# Patient Record
Sex: FEMALE | Race: BLACK OR AFRICAN AMERICAN | NOT HISPANIC OR LATINO | Employment: FULL TIME | RURAL
[De-identification: names, ages, dates, MRNs, and addresses within clinical notes are randomized per-mention and may not be internally consistent; named-entity substitution may affect disease eponyms.]

---

## 2022-05-12 ENCOUNTER — HOSPITAL ENCOUNTER (EMERGENCY)
Facility: HOSPITAL | Age: 52
Discharge: HOME | End: 2022-05-12

## 2022-05-12 VITALS
RESPIRATION RATE: 20 BRPM | TEMPERATURE: 98 F | DIASTOLIC BLOOD PRESSURE: 91 MMHG | WEIGHT: 165 LBS | BODY MASS INDEX: 23.62 KG/M2 | SYSTOLIC BLOOD PRESSURE: 153 MMHG | HEART RATE: 74 BPM | HEIGHT: 70 IN | OXYGEN SATURATION: 100 %

## 2022-05-12 DIAGNOSIS — S20.212A RIB CONTUSION, LEFT, INITIAL ENCOUNTER: ICD-10-CM

## 2022-05-12 DIAGNOSIS — S52.202A FRACTURE OF RADIAL SHAFT WITH ULNA, CLOSED, LEFT, INITIAL ENCOUNTER: ICD-10-CM

## 2022-05-12 DIAGNOSIS — S52.201A CLOSED FRACTURE OF SHAFT OF RIGHT RADIUS AND ULNA, INITIAL ENCOUNTER: ICD-10-CM

## 2022-05-12 DIAGNOSIS — S00.03XA CONTUSION OF SCALP, INITIAL ENCOUNTER: ICD-10-CM

## 2022-05-12 DIAGNOSIS — S52.301A CLOSED FRACTURE OF SHAFT OF RIGHT RADIUS AND ULNA, INITIAL ENCOUNTER: ICD-10-CM
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
## 2022-05-12 DIAGNOSIS — S52.302A FRACTURE OF RADIAL SHAFT WITH ULNA, CLOSED, LEFT, INITIAL ENCOUNTER: ICD-10-CM

## 2022-05-12 DIAGNOSIS — N39.0 ACUTE URINARY TRACT INFECTION: ICD-10-CM

## 2022-05-12 DIAGNOSIS — R07.81 RIB PAIN ON LEFT SIDE: ICD-10-CM

## 2022-05-12 DIAGNOSIS — I10 SEVERE UNCONTROLLED HYPERTENSION: ICD-10-CM

## 2022-05-12 LAB
ALBUMIN SERPL BCP-MCNC: 3.9 G/DL (ref 3.5–5)
ALBUMIN/GLOB SERPL: 1.2 {RATIO}
ALP SERPL-CCNC: 107 U/L (ref 41–108)
ALT SERPL W P-5'-P-CCNC: 24 U/L (ref 13–56)
AMPHET UR QL SCN: NEGATIVE
ANION GAP SERPL CALCULATED.3IONS-SCNC: 20 MMOL/L (ref 7–16)
AST SERPL W P-5'-P-CCNC: 27 U/L (ref 15–37)
B-HCG UR QL: NEGATIVE
BACTERIA #/AREA URNS HPF: ABNORMAL /HPF
BARBITURATES UR QL SCN: NEGATIVE
BASOPHILS # BLD AUTO: 0.03 K/UL (ref 0–0.2)
BASOPHILS NFR BLD AUTO: 0.2 % (ref 0–1)
BENZODIAZ METAB UR QL SCN: NEGATIVE
BILIRUB SERPL-MCNC: 0.8 MG/DL (ref 0–1.2)
BILIRUB UR QL STRIP: NEGATIVE
BUN SERPL-MCNC: 11 MG/DL (ref 7–18)
BUN/CREAT SERPL: 12 (ref 6–20)
CALCIUM SERPL-MCNC: 9.1 MG/DL (ref 8.5–10.1)
CANNABINOIDS UR QL SCN: POSITIVE
CHLORIDE SERPL-SCNC: 104 MMOL/L (ref 98–107)
CLARITY UR: ABNORMAL
CO2 SERPL-SCNC: 20 MMOL/L (ref 21–32)
COCAINE UR QL SCN: POSITIVE
COLOR UR: ABNORMAL
CREAT SERPL-MCNC: 0.93 MG/DL (ref 0.55–1.02)
CTP QC/QA: YES
DIFFERENTIAL METHOD BLD: ABNORMAL
EOSINOPHIL # BLD AUTO: 0.01 K/UL (ref 0–0.5)
EOSINOPHIL NFR BLD AUTO: 0.1 % (ref 1–4)
ERYTHROCYTE [DISTWIDTH] IN BLOOD BY AUTOMATED COUNT: 16.6 % (ref 11.5–14.5)
GLOBULIN SER-MCNC: 3.3 G/DL (ref 2–4)
GLUCOSE SERPL-MCNC: 99 MG/DL (ref 74–106)
GLUCOSE UR STRIP-MCNC: NEGATIVE MG/DL
HCT VFR BLD AUTO: 39.4 % (ref 38–47)
HGB BLD-MCNC: 13.1 G/DL (ref 12–16)
IMM GRANULOCYTES # BLD AUTO: 0.25 K/UL (ref 0–0.04)
IMM GRANULOCYTES NFR BLD: 1.5 % (ref 0–0.4)
KETONES UR STRIP-SCNC: 15 MG/DL
LEUKOCYTE ESTERASE UR QL STRIP: ABNORMAL
LYMPHOCYTES # BLD AUTO: 1.42 K/UL (ref 1–4.8)
LYMPHOCYTES NFR BLD AUTO: 8.5 % (ref 27–41)
MAGNESIUM SERPL-MCNC: 1.8 MG/DL (ref 1.7–2.3)
MCH RBC QN AUTO: 28.9 PG (ref 27–31)
MCHC RBC AUTO-ENTMCNC: 33.2 G/DL (ref 32–36)
MCV RBC AUTO: 87 FL (ref 80–96)
MONOCYTES # BLD AUTO: 0.88 K/UL (ref 0–0.8)
MONOCYTES NFR BLD AUTO: 5.3 % (ref 2–6)
MPC BLD CALC-MCNC: 11.6 FL (ref 9.4–12.4)
MUCOUS THREADS #/AREA URNS HPF: ABNORMAL /HPF
NEUTROPHILS # BLD AUTO: 14.12 K/UL (ref 1.8–7.7)
NEUTROPHILS NFR BLD AUTO: 84.4 % (ref 53–65)
NITRITE UR QL STRIP: NEGATIVE
NRBC # BLD AUTO: 0 X10E3/UL
NRBC, AUTO (.00): 0 %
OPIATES UR QL SCN: POSITIVE
PCP UR QL SCN: NEGATIVE
PH UR STRIP: 6 PH UNITS
PLATELET # BLD AUTO: 249 K/UL (ref 150–400)
POTASSIUM SERPL-SCNC: 3.7 MMOL/L (ref 3.5–5.1)
PROT SERPL-MCNC: 7.2 G/DL (ref 6.4–8.2)
PROT UR QL STRIP: 30
RBC # BLD AUTO: 4.53 M/UL (ref 4.2–5.4)
RBC # UR STRIP: ABNORMAL /UL
RBC #/AREA URNS HPF: ABNORMAL /HPF
SODIUM SERPL-SCNC: 140 MMOL/L (ref 136–145)
SP GR UR STRIP: 1.02
SQUAMOUS #/AREA URNS LPF: ABNORMAL /LPF
UROBILINOGEN UR STRIP-ACNC: 0.2 MG/DL
WBC # BLD AUTO: 16.71 K/UL (ref 4.5–11)
WBC #/AREA URNS HPF: ABNORMAL /HPF

## 2022-05-12 PROCEDURE — 96361 HYDRATE IV INFUSION ADD-ON: CPT

## 2022-05-12 PROCEDURE — 81025 URINE PREGNANCY TEST: CPT | Performed by: NURSE PRACTITIONER

## 2022-05-12 PROCEDURE — 80307 DRUG TEST PRSMV CHEM ANLYZR: CPT | Performed by: NURSE PRACTITIONER

## 2022-05-12 PROCEDURE — 25000003 PHARM REV CODE 250: Performed by: NURSE PRACTITIONER

## 2022-05-12 PROCEDURE — 96375 TX/PRO/DX INJ NEW DRUG ADDON: CPT

## 2022-05-12 PROCEDURE — 99284 PR EMERGENCY DEPT VISIT,LEVEL IV: ICD-10-PCS | Mod: ,,, | Performed by: NURSE PRACTITIONER

## 2022-05-12 PROCEDURE — 81001 URINALYSIS AUTO W/SCOPE: CPT | Performed by: NURSE PRACTITIONER

## 2022-05-12 PROCEDURE — 85025 COMPLETE CBC W/AUTO DIFF WBC: CPT | Performed by: NURSE PRACTITIONER

## 2022-05-12 PROCEDURE — 87086 URINE CULTURE/COLONY COUNT: CPT | Performed by: NURSE PRACTITIONER

## 2022-05-12 PROCEDURE — 83735 ASSAY OF MAGNESIUM: CPT | Performed by: NURSE PRACTITIONER

## 2022-05-12 PROCEDURE — 99285 EMERGENCY DEPT VISIT HI MDM: CPT | Mod: 25

## 2022-05-12 PROCEDURE — 63600175 PHARM REV CODE 636 W HCPCS: Performed by: NURSE PRACTITIONER

## 2022-05-12 PROCEDURE — 96365 THER/PROPH/DIAG IV INF INIT: CPT

## 2022-05-12 PROCEDURE — 87077 CULTURE AEROBIC IDENTIFY: CPT | Performed by: NURSE PRACTITIONER

## 2022-05-12 PROCEDURE — 80053 COMPREHEN METABOLIC PANEL: CPT | Performed by: NURSE PRACTITIONER

## 2022-05-12 PROCEDURE — 99284 EMERGENCY DEPT VISIT MOD MDM: CPT | Mod: ,,, | Performed by: NURSE PRACTITIONER

## 2022-05-12 PROCEDURE — 36415 COLL VENOUS BLD VENIPUNCTURE: CPT | Performed by: NURSE PRACTITIONER

## 2022-05-12 RX ORDER — LABETALOL HYDROCHLORIDE 5 MG/ML
10 INJECTION, SOLUTION INTRAVENOUS
Status: COMPLETED | OUTPATIENT
Start: 2022-05-12 | End: 2022-05-12

## 2022-05-12 RX ORDER — HYDRALAZINE HYDROCHLORIDE 20 MG/ML
10 INJECTION INTRAMUSCULAR; INTRAVENOUS
Status: COMPLETED | OUTPATIENT
Start: 2022-05-12 | End: 2022-05-12

## 2022-05-12 RX ORDER — ONDANSETRON 2 MG/ML
4 INJECTION INTRAMUSCULAR; INTRAVENOUS
Status: COMPLETED | OUTPATIENT
Start: 2022-05-12 | End: 2022-05-12

## 2022-05-12 RX ORDER — NITROFURANTOIN 25; 75 MG/1; MG/1
100 CAPSULE ORAL 2 TIMES DAILY
Qty: 20 CAPSULE | Refills: 0 | Status: SHIPPED | OUTPATIENT
Start: 2022-05-12 | End: 2022-05-22

## 2022-05-12 RX ORDER — IBUPROFEN 800 MG/1
800 TABLET ORAL EVERY 6 HOURS PRN
Qty: 20 TABLET | Refills: 0 | Status: SHIPPED | OUTPATIENT
Start: 2022-05-12

## 2022-05-12 RX ORDER — MORPHINE SULFATE 4 MG/ML
4 INJECTION, SOLUTION INTRAMUSCULAR; INTRAVENOUS
Status: COMPLETED | OUTPATIENT
Start: 2022-05-12 | End: 2022-05-12

## 2022-05-12 RX ORDER — TRAMADOL HYDROCHLORIDE 50 MG/1
50 TABLET ORAL EVERY 6 HOURS PRN
Qty: 12 TABLET | Refills: 0 | Status: SHIPPED | OUTPATIENT
Start: 2022-05-12

## 2022-05-12 RX ORDER — HYDROCODONE BITARTRATE AND ACETAMINOPHEN 10; 325 MG/1; MG/1
1 TABLET ORAL
Status: COMPLETED | OUTPATIENT
Start: 2022-05-12 | End: 2022-05-12

## 2022-05-12 RX ADMIN — ONDANSETRON 4 MG: 2 INJECTION INTRAMUSCULAR; INTRAVENOUS at 02:05

## 2022-05-12 RX ADMIN — CEFTRIAXONE SODIUM 1 G: 1 INJECTION, POWDER, FOR SOLUTION INTRAMUSCULAR; INTRAVENOUS at 02:05

## 2022-05-12 RX ADMIN — HYDRALAZINE HYDROCHLORIDE 10 MG: 20 INJECTION INTRAMUSCULAR; INTRAVENOUS at 12:05

## 2022-05-12 RX ADMIN — HYDROCODONE BITARTRATE AND ACETAMINOPHEN 1 TABLET: 10; 325 TABLET ORAL at 05:05

## 2022-05-12 RX ADMIN — LABETALOL HYDROCHLORIDE 10 MG: 5 INJECTION, SOLUTION INTRAVENOUS at 02:05

## 2022-05-12 RX ADMIN — MORPHINE SULFATE 4 MG: 4 INJECTION INTRAVENOUS at 02:05

## 2022-05-12 RX ADMIN — SODIUM CHLORIDE 1000 ML: 9 INJECTION, SOLUTION INTRAVENOUS at 12:05

## 2022-05-12 NOTE — ED NOTES
sugartong splints applied to left and right arm and arm slings applied. Elevated both arms on pillows. Capillary refill < 3 seconds bilaterally.

## 2022-05-12 NOTE — DISCHARGE INSTRUCTIONS
Rest, ice, and elevate bilateral arms.   Wear casts until follow up with your ortho.  Schedule appointment with your ortho in [place] in next 2-3 days.   Take medication as prescribed.   Encourage fluid intake to keep hydrated.   Return to ER with new or worsening symptoms.
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
## 2022-05-14 LAB — UA COMPLETE W REFLEX CULTURE PNL UR: ABNORMAL

## 2023-10-11 ENCOUNTER — OFFICE VISIT (OUTPATIENT)
Dept: OBSTETRICS AND GYNECOLOGY | Facility: CLINIC | Age: 53
End: 2023-10-11

## 2023-10-11 VITALS
SYSTOLIC BLOOD PRESSURE: 160 MMHG | OXYGEN SATURATION: 98 % | HEIGHT: 70 IN | DIASTOLIC BLOOD PRESSURE: 110 MMHG | RESPIRATION RATE: 18 BRPM | HEART RATE: 86 BPM | WEIGHT: 161 LBS | BODY MASS INDEX: 23.05 KG/M2

## 2023-10-11 DIAGNOSIS — Z01.818 PRE-OP EXAM: ICD-10-CM

## 2023-10-11 DIAGNOSIS — N93.9 VAGINAL BLEEDING: Primary | ICD-10-CM

## 2023-10-11 PROCEDURE — 99204 PR OFFICE/OUTPT VISIT, NEW, LEVL IV, 45-59 MIN: ICD-10-PCS | Mod: S$PBB,25,, | Performed by: OBSTETRICS & GYNECOLOGY

## 2023-10-11 PROCEDURE — 99999PBSHW PR PBB SHADOW TECHNICAL ONLY FILED TO HB: Mod: PBBFAC,,,

## 2023-10-11 PROCEDURE — 99204 OFFICE O/P NEW MOD 45 MIN: CPT | Mod: S$PBB,25,, | Performed by: OBSTETRICS & GYNECOLOGY

## 2023-10-11 PROCEDURE — 96372 THER/PROPH/DIAG INJ SC/IM: CPT | Mod: PBBFAC | Performed by: OBSTETRICS & GYNECOLOGY

## 2023-10-11 PROCEDURE — 99999PBSHW PR PBB SHADOW TECHNICAL ONLY FILED TO HB: ICD-10-PCS | Mod: PBBFAC,,,

## 2023-10-11 PROCEDURE — 99214 OFFICE O/P EST MOD 30 MIN: CPT | Mod: PBBFAC | Performed by: OBSTETRICS & GYNECOLOGY

## 2023-10-11 RX ORDER — ACETAMINOPHEN AND CODEINE PHOSPHATE 300; 30 MG/1; MG/1
1 TABLET ORAL EVERY 4 HOURS PRN
COMMUNITY
Start: 2023-09-30

## 2023-10-11 RX ORDER — CEFTRIAXONE 500 MG/1
500 INJECTION, POWDER, FOR SOLUTION INTRAMUSCULAR; INTRAVENOUS
Status: COMPLETED | OUTPATIENT
Start: 2023-10-11 | End: 2023-10-11

## 2023-10-11 RX ORDER — DOXYCYCLINE 100 MG/1
100 CAPSULE ORAL 2 TIMES DAILY
Qty: 20 CAPSULE | Refills: 0 | Status: CANCELLED | OUTPATIENT
Start: 2023-10-11

## 2023-10-11 RX ORDER — DOXYCYCLINE 100 MG/1
100 CAPSULE ORAL 2 TIMES DAILY
Qty: 20 CAPSULE | Refills: 0 | Status: SHIPPED | OUTPATIENT
Start: 2023-10-11

## 2023-10-11 RX ADMIN — CEFTRIAXONE SODIUM 500 MG: 500 INJECTION, POWDER, FOR SOLUTION INTRAMUSCULAR; INTRAVENOUS at 04:10

## 2023-10-12 DIAGNOSIS — N93.9 VAGINAL BLEEDING: Primary | ICD-10-CM

## 2023-10-12 NOTE — PROGRESS NOTES
Linda Lopez female  for   Chief Complaint   Patient presents with    Dysfunctional Uterine Bleeding     PT states that she has not had a period over a year now. 3 months ago she has been having discharge the color red/grayish. Also states that she is bleeding through everything. She has passed multiple of clots.  that referred her stated that she had fibroids. She is having pelvic pain and pressure .      OB History    No obstetric history on file.          Past Medical History:   Diagnosis Date    Hypertension       Past Surgical History:   Procedure Laterality Date    BILATERAL TUBAL LIGATION Bilateral     CHOLECYSTECTOMY        Review of patient's allergies indicates:  No Known Allergies          Physical exam:     General Appearance: healthy    Abdomen:Normal, benign.    Pelvic: Pelvic exam: normal external genitalia, vulva, vagina, cervix, uterus and adnexa, VULVA: normal appearing vulva with no masses, tenderness or lesions, CERVIX: normal appearing cervix without discharge or lesions, UTERUS:  Enlarged and irregular, somewhat tender, ADNEXA: normal adnexa in size, nontender and no masses, RECTAL:deferred normal rectal, no masses, guaiac negative stool obtained.     Extremity:normal    Skin: normal exam        Assessment:   Problem List Items Addressed This Visit    None  Visit Diagnoses       Pre-op exam    -  Primary    Vaginal bleeding        Relevant Medications    doxycycline (VIBRAMYCIN) 100 MG Cap    Other Relevant Orders    CBC Auto Differential (Completed)    Comprehensive Metabolic Panel (Completed)    Type & Screen (Completed)    Syphilis Antibody with reflex to RPR (Completed)    Urine culture    Urinalysis, Reflex to Urine Culture (Completed)    HCG, Serum, Qualitative (Completed)    US Pelvis Complete Non OB             Plan:  The patient is bleeding has been somewhat heavy and she did go 4 year without having a period.  The patient has been scheduled for a D&C, hysteroscopy for evaluation for  postmenopausal bleeding.  Hemoglobin and hematocrit were obtained today and she was given 500 mg of Rocephin IM.  A sonogram also has been scheduled.

## 2023-10-16 ENCOUNTER — HOSPITAL ENCOUNTER (OUTPATIENT)
Dept: RADIOLOGY | Facility: HOSPITAL | Age: 53
Discharge: HOME | End: 2023-10-16
Attending: OBSTETRICS & GYNECOLOGY

## 2023-10-16 ENCOUNTER — OFFICE VISIT (OUTPATIENT)
Dept: OBSTETRICS AND GYNECOLOGY | Facility: CLINIC | Age: 53
End: 2023-10-16

## 2023-10-16 VITALS
BODY MASS INDEX: 23.05 KG/M2 | SYSTOLIC BLOOD PRESSURE: 160 MMHG | HEIGHT: 70 IN | WEIGHT: 161 LBS | DIASTOLIC BLOOD PRESSURE: 100 MMHG

## 2023-10-16 DIAGNOSIS — A49.9 BACTERIAL INFECTION: ICD-10-CM

## 2023-10-16 DIAGNOSIS — N93.9 VAGINAL BLEEDING: Primary | ICD-10-CM

## 2023-10-16 DIAGNOSIS — N93.9 VAGINAL BLEEDING: ICD-10-CM

## 2023-10-16 PROCEDURE — 99999PBSHW PR PBB SHADOW TECHNICAL ONLY FILED TO HB: Mod: PBBFAC,,,

## 2023-10-16 PROCEDURE — 76830 TRANSVAGINAL US NON-OB: CPT | Mod: 26,,, | Performed by: RADIOLOGY

## 2023-10-16 PROCEDURE — 99213 OFFICE O/P EST LOW 20 MIN: CPT | Mod: PBBFAC,25 | Performed by: OBSTETRICS & GYNECOLOGY

## 2023-10-16 PROCEDURE — 99999PBSHW PR PBB SHADOW TECHNICAL ONLY FILED TO HB: ICD-10-PCS | Mod: PBBFAC,,,

## 2023-10-16 PROCEDURE — 96372 THER/PROPH/DIAG INJ SC/IM: CPT | Mod: PBBFAC | Performed by: OBSTETRICS & GYNECOLOGY

## 2023-10-16 PROCEDURE — 76830 US OB <14 WEEKS, TRANSABDOM & TRANSVAG, SINGLE GESTATION (XPD): ICD-10-PCS | Mod: 26,,, | Performed by: RADIOLOGY

## 2023-10-16 PROCEDURE — 99213 PR OFFICE/OUTPT VISIT, EST, LEVL III, 20-29 MIN: ICD-10-PCS | Mod: S$PBB,25,, | Performed by: OBSTETRICS & GYNECOLOGY

## 2023-10-16 PROCEDURE — 76801 OB US < 14 WKS SINGLE FETUS: CPT | Mod: TC

## 2023-10-16 PROCEDURE — 99213 OFFICE O/P EST LOW 20 MIN: CPT | Mod: S$PBB,25,, | Performed by: OBSTETRICS & GYNECOLOGY

## 2023-10-16 PROCEDURE — 76856 US OB <14 WEEKS, TRANSABDOM & TRANSVAG, SINGLE GESTATION (XPD): ICD-10-PCS | Mod: 26,,, | Performed by: RADIOLOGY

## 2023-10-16 PROCEDURE — 76856 US EXAM PELVIC COMPLETE: CPT | Mod: 26,,, | Performed by: RADIOLOGY

## 2023-10-16 RX ORDER — CEFTRIAXONE 500 MG/1
500 INJECTION, POWDER, FOR SOLUTION INTRAMUSCULAR; INTRAVENOUS
Status: COMPLETED | OUTPATIENT
Start: 2023-10-16 | End: 2023-10-16

## 2023-10-16 RX ADMIN — CEFTRIAXONE SODIUM 500 MG: 500 INJECTION, POWDER, FOR SOLUTION INTRAMUSCULAR; INTRAVENOUS at 01:10

## 2023-10-17 NOTE — PROGRESS NOTES
Linda Lopez female  for   Chief Complaint   Patient presents with    Follow-up     PT is here following up US results, she is also still in pain.      OB History    No obstetric history on file.          Past Medical History:   Diagnosis Date    Hypertension       Past Surgical History:   Procedure Laterality Date    BILATERAL TUBAL LIGATION Bilateral     CHOLECYSTECTOMY        Review of patient's allergies indicates:  No Known Allergies          Physical exam:     General Appearance: healthy    Abdomen:Normal, benign.    Pelvic: Pelvic exam: normal external genitalia, vulva, vagina, cervix, uterus and adnexa, VULVA: normal appearing vulva with no masses, tenderness or lesions, CERVIX: normal appearing cervix without discharge or lesions, UTERUS:  Enlarged and tender, ADNEXA: normal adnexa in size, nontender and no masses, RECTAL:deferred normal rectal, no masses, guaiac negative stool obtained.     Extremity:normal    Skin: normal exam        Assessment:   Problem List Items Addressed This Visit    None  Visit Diagnoses       Vaginal bleeding    -  Primary    Bacterial infection        Relevant Orders    Chlamydia/GC, PCR             Plan:  The patient had 2 minimally positive pregnancy test that was a false positive.  The patient has not had sex in a year and a half.  The patient is uterus is  and she was given Rocephin today and continue her or on oral antibiotics.  A CBC, gonorrhea and chlamydia cultures were obtained today.  The patient's sonogram continues to show a thickened endometrium and the calves suffocation inside the uterus possibly representative fibroid.  She is been scheduled for a D&C, hysteroscopy.

## 2023-10-18 ENCOUNTER — HOSPITAL ENCOUNTER (INPATIENT)
Facility: HOSPITAL | Age: 53
LOS: 4 days | Discharge: ANOTHER HOSPITAL | DRG: 744 | End: 2023-10-24
Attending: OBSTETRICS & GYNECOLOGY | Admitting: OBSTETRICS & GYNECOLOGY

## 2023-10-18 ENCOUNTER — ANESTHESIA EVENT (OUTPATIENT)
Dept: SURGERY | Facility: HOSPITAL | Age: 53
DRG: 744 | End: 2023-10-18

## 2023-10-18 ENCOUNTER — ANESTHESIA (OUTPATIENT)
Dept: SURGERY | Facility: HOSPITAL | Age: 53
DRG: 744 | End: 2023-10-18

## 2023-10-18 ENCOUNTER — OFFICE VISIT (OUTPATIENT)
Dept: OBSTETRICS AND GYNECOLOGY | Facility: CLINIC | Age: 53
End: 2023-10-18

## 2023-10-18 VITALS — SYSTOLIC BLOOD PRESSURE: 160 MMHG | DIASTOLIC BLOOD PRESSURE: 100 MMHG | HEIGHT: 70 IN | BODY MASS INDEX: 23.1 KG/M2

## 2023-10-18 DIAGNOSIS — N93.9 VAGINA BLEEDING: ICD-10-CM

## 2023-10-18 DIAGNOSIS — N93.9 VAGINAL BLEEDING: Primary | ICD-10-CM

## 2023-10-18 LAB
CREAT SERPL-MCNC: 1.32 MG/DL (ref 0.55–1.02)
EGFR (NO RACE VARIABLE) (RUSH/TITUS): 49 ML/MIN/1.73M2

## 2023-10-18 PROCEDURE — 88342 SURGICAL PATHOLOGY: ICD-10-PCS | Mod: 26,,, | Performed by: PATHOLOGY

## 2023-10-18 PROCEDURE — 37000009 HC ANESTHESIA EA ADD 15 MINS: Performed by: OBSTETRICS & GYNECOLOGY

## 2023-10-18 PROCEDURE — 25000003 PHARM REV CODE 250: Performed by: OBSTETRICS & GYNECOLOGY

## 2023-10-18 PROCEDURE — 88363 XM ARCHIVE TISSUE MOLEC ANAL: CPT | Mod: ,,, | Performed by: PATHOLOGY

## 2023-10-18 PROCEDURE — 71000033 HC RECOVERY, INTIAL HOUR: Performed by: OBSTETRICS & GYNECOLOGY

## 2023-10-18 PROCEDURE — 88341 SURGICAL PATHOLOGY: ICD-10-PCS | Mod: 26,,, | Performed by: PATHOLOGY

## 2023-10-18 PROCEDURE — 88305 SURGICAL PATHOLOGY: ICD-10-PCS | Mod: 26,59,, | Performed by: PATHOLOGY

## 2023-10-18 PROCEDURE — 27000655: Performed by: NURSE ANESTHETIST, CERTIFIED REGISTERED

## 2023-10-18 PROCEDURE — 37000008 HC ANESTHESIA 1ST 15 MINUTES: Performed by: OBSTETRICS & GYNECOLOGY

## 2023-10-18 PROCEDURE — 88363 SURGICAL PATHOLOGY: ICD-10-PCS | Mod: ,,, | Performed by: PATHOLOGY

## 2023-10-18 PROCEDURE — 27000177 HC AIRWAY, LARYNGEAL MASK: Performed by: NURSE ANESTHETIST, CERTIFIED REGISTERED

## 2023-10-18 PROCEDURE — 88342 IMHCHEM/IMCYTCHM 1ST ANTB: CPT | Mod: 26,,, | Performed by: PATHOLOGY

## 2023-10-18 PROCEDURE — 25000003 PHARM REV CODE 250: Performed by: NURSE ANESTHETIST, CERTIFIED REGISTERED

## 2023-10-18 PROCEDURE — 25000003 PHARM REV CODE 250

## 2023-10-18 PROCEDURE — 63600175 PHARM REV CODE 636 W HCPCS

## 2023-10-18 PROCEDURE — 99213 PR OFFICE/OUTPT VISIT, EST, LEVL III, 20-29 MIN: ICD-10-PCS | Mod: S$PBB,,, | Performed by: OBSTETRICS & GYNECOLOGY

## 2023-10-18 PROCEDURE — 99213 OFFICE O/P EST LOW 20 MIN: CPT | Mod: PBBFAC,25 | Performed by: OBSTETRICS & GYNECOLOGY

## 2023-10-18 PROCEDURE — 88305 TISSUE EXAM BY PATHOLOGIST: CPT | Mod: 26,59,, | Performed by: PATHOLOGY

## 2023-10-18 PROCEDURE — 63600175 PHARM REV CODE 636 W HCPCS: Performed by: ANESTHESIOLOGY

## 2023-10-18 PROCEDURE — D9220A PRA ANESTHESIA: ICD-10-PCS | Mod: ,,, | Performed by: ANESTHESIOLOGY

## 2023-10-18 PROCEDURE — 71000039 HC RECOVERY, EACH ADD'L HOUR: Performed by: OBSTETRICS & GYNECOLOGY

## 2023-10-18 PROCEDURE — 63600175 PHARM REV CODE 636 W HCPCS: Performed by: NURSE ANESTHETIST, CERTIFIED REGISTERED

## 2023-10-18 PROCEDURE — 88305 TISSUE EXAM BY PATHOLOGIST: CPT | Mod: TC,SUR | Performed by: OBSTETRICS & GYNECOLOGY

## 2023-10-18 PROCEDURE — 36000706: Performed by: OBSTETRICS & GYNECOLOGY

## 2023-10-18 PROCEDURE — 27000510 HC BLANKET BAIR HUGGER ANY SIZE: Performed by: NURSE ANESTHETIST, CERTIFIED REGISTERED

## 2023-10-18 PROCEDURE — 88341 IMHCHEM/IMCYTCHM EA ADD ANTB: CPT | Mod: 26,,, | Performed by: PATHOLOGY

## 2023-10-18 PROCEDURE — D9220A PRA ANESTHESIA: Mod: ,,, | Performed by: ANESTHESIOLOGY

## 2023-10-18 PROCEDURE — 36000707: Performed by: OBSTETRICS & GYNECOLOGY

## 2023-10-18 PROCEDURE — 82565 ASSAY OF CREATININE: CPT | Performed by: OBSTETRICS & GYNECOLOGY

## 2023-10-18 PROCEDURE — 99900035 HC TECH TIME PER 15 MIN (STAT)

## 2023-10-18 PROCEDURE — 63600175 PHARM REV CODE 636 W HCPCS: Performed by: OBSTETRICS & GYNECOLOGY

## 2023-10-18 PROCEDURE — 99213 OFFICE O/P EST LOW 20 MIN: CPT | Mod: S$PBB,,, | Performed by: OBSTETRICS & GYNECOLOGY

## 2023-10-18 RX ORDER — DEXTROSE MONOHYDRATE 5 G/100ML
INJECTION INTRAVENOUS
Status: COMPLETED
Start: 2023-10-18 | End: 2023-10-18

## 2023-10-18 RX ORDER — DEXAMETHASONE SODIUM PHOSPHATE 4 MG/ML
INJECTION, SOLUTION INTRA-ARTICULAR; INTRALESIONAL; INTRAMUSCULAR; INTRAVENOUS; SOFT TISSUE
Status: DISCONTINUED | OUTPATIENT
Start: 2023-10-18 | End: 2023-10-18

## 2023-10-18 RX ORDER — DIPHENHYDRAMINE HCL 25 MG
25 CAPSULE ORAL EVERY 4 HOURS PRN
Status: CANCELLED | OUTPATIENT
Start: 2023-10-18

## 2023-10-18 RX ORDER — MORPHINE SULFATE 10 MG/ML
4 INJECTION INTRAMUSCULAR; INTRAVENOUS; SUBCUTANEOUS EVERY 5 MIN PRN
Status: DISCONTINUED | OUTPATIENT
Start: 2023-10-18 | End: 2023-10-18 | Stop reason: HOSPADM

## 2023-10-18 RX ORDER — DIPHENHYDRAMINE HYDROCHLORIDE 50 MG/ML
25 INJECTION INTRAMUSCULAR; INTRAVENOUS EVERY 4 HOURS PRN
Status: DISCONTINUED | OUTPATIENT
Start: 2023-10-18 | End: 2023-10-25 | Stop reason: HOSPADM

## 2023-10-18 RX ORDER — PROCHLORPERAZINE EDISYLATE 5 MG/ML
5 INJECTION INTRAMUSCULAR; INTRAVENOUS EVERY 6 HOURS PRN
Status: DISCONTINUED | OUTPATIENT
Start: 2023-10-18 | End: 2023-10-25 | Stop reason: HOSPADM

## 2023-10-18 RX ORDER — MEPERIDINE HYDROCHLORIDE 25 MG/ML
25 INJECTION INTRAMUSCULAR; INTRAVENOUS; SUBCUTANEOUS ONCE AS NEEDED
Status: DISCONTINUED | OUTPATIENT
Start: 2023-10-18 | End: 2023-10-18 | Stop reason: HOSPADM

## 2023-10-18 RX ORDER — PROCHLORPERAZINE EDISYLATE 5 MG/ML
5 INJECTION INTRAMUSCULAR; INTRAVENOUS EVERY 6 HOURS PRN
Status: CANCELLED | OUTPATIENT
Start: 2023-10-18

## 2023-10-18 RX ORDER — MIDAZOLAM HYDROCHLORIDE 1 MG/ML
INJECTION INTRAMUSCULAR; INTRAVENOUS
Status: DISCONTINUED | OUTPATIENT
Start: 2023-10-18 | End: 2023-10-18

## 2023-10-18 RX ORDER — ONDANSETRON 2 MG/ML
4 INJECTION INTRAMUSCULAR; INTRAVENOUS DAILY PRN
Status: DISCONTINUED | OUTPATIENT
Start: 2023-10-18 | End: 2023-10-18 | Stop reason: HOSPADM

## 2023-10-18 RX ORDER — FENTANYL CITRATE 50 UG/ML
INJECTION, SOLUTION INTRAMUSCULAR; INTRAVENOUS
Status: DISCONTINUED | OUTPATIENT
Start: 2023-10-18 | End: 2023-10-18

## 2023-10-18 RX ORDER — HYDROMORPHONE HYDROCHLORIDE 2 MG/ML
1 INJECTION, SOLUTION INTRAMUSCULAR; INTRAVENOUS; SUBCUTANEOUS EVERY 4 HOURS PRN
Status: CANCELLED | OUTPATIENT
Start: 2023-10-18

## 2023-10-18 RX ORDER — HYDROCODONE BITARTRATE AND ACETAMINOPHEN 5; 325 MG/1; MG/1
1 TABLET ORAL EVERY 4 HOURS PRN
Status: CANCELLED | OUTPATIENT
Start: 2023-10-18

## 2023-10-18 RX ORDER — ONDANSETRON 2 MG/ML
INJECTION INTRAMUSCULAR; INTRAVENOUS
Status: DISCONTINUED | OUTPATIENT
Start: 2023-10-18 | End: 2023-10-18

## 2023-10-18 RX ORDER — ACETAMINOPHEN 325 MG/1
650 TABLET ORAL EVERY 4 HOURS PRN
Status: CANCELLED | OUTPATIENT
Start: 2023-10-18

## 2023-10-18 RX ORDER — HYDROCODONE BITARTRATE AND ACETAMINOPHEN 5; 325 MG/1; MG/1
1 TABLET ORAL EVERY 4 HOURS PRN
Status: DISCONTINUED | OUTPATIENT
Start: 2023-10-18 | End: 2023-10-25 | Stop reason: HOSPADM

## 2023-10-18 RX ORDER — LIDOCAINE HYDROCHLORIDE 20 MG/ML
INJECTION, SOLUTION EPIDURAL; INFILTRATION; INTRACAUDAL; PERINEURAL
Status: DISCONTINUED | OUTPATIENT
Start: 2023-10-18 | End: 2023-10-18

## 2023-10-18 RX ORDER — CEFAZOLIN SODIUM 1 G/3ML
INJECTION, POWDER, FOR SOLUTION INTRAMUSCULAR; INTRAVENOUS
Status: DISCONTINUED | OUTPATIENT
Start: 2023-10-18 | End: 2023-10-18

## 2023-10-18 RX ORDER — HYDRALAZINE HYDROCHLORIDE 20 MG/ML
10 INJECTION INTRAMUSCULAR; INTRAVENOUS ONCE
Status: COMPLETED | OUTPATIENT
Start: 2023-10-18 | End: 2023-10-18

## 2023-10-18 RX ORDER — ONDANSETRON 4 MG/1
8 TABLET, ORALLY DISINTEGRATING ORAL EVERY 8 HOURS PRN
Status: CANCELLED | OUTPATIENT
Start: 2023-10-18

## 2023-10-18 RX ORDER — HYDROMORPHONE HYDROCHLORIDE 2 MG/ML
0.5 INJECTION, SOLUTION INTRAMUSCULAR; INTRAVENOUS; SUBCUTANEOUS EVERY 5 MIN PRN
Status: DISCONTINUED | OUTPATIENT
Start: 2023-10-18 | End: 2023-10-18 | Stop reason: HOSPADM

## 2023-10-18 RX ORDER — PROPOFOL 10 MG/ML
VIAL (ML) INTRAVENOUS
Status: DISCONTINUED | OUTPATIENT
Start: 2023-10-18 | End: 2023-10-18

## 2023-10-18 RX ORDER — ACETAMINOPHEN 325 MG/1
650 TABLET ORAL EVERY 4 HOURS PRN
Status: DISCONTINUED | OUTPATIENT
Start: 2023-10-18 | End: 2023-10-25 | Stop reason: HOSPADM

## 2023-10-18 RX ORDER — DIPHENHYDRAMINE HCL 25 MG
25 CAPSULE ORAL EVERY 4 HOURS PRN
Status: DISCONTINUED | OUTPATIENT
Start: 2023-10-18 | End: 2023-10-25 | Stop reason: HOSPADM

## 2023-10-18 RX ORDER — DIPHENHYDRAMINE HYDROCHLORIDE 50 MG/ML
25 INJECTION INTRAMUSCULAR; INTRAVENOUS EVERY 4 HOURS PRN
Status: CANCELLED | OUTPATIENT
Start: 2023-10-18

## 2023-10-18 RX ORDER — DIPHENHYDRAMINE HYDROCHLORIDE 50 MG/ML
25 INJECTION INTRAMUSCULAR; INTRAVENOUS EVERY 6 HOURS PRN
Status: DISCONTINUED | OUTPATIENT
Start: 2023-10-18 | End: 2023-10-18 | Stop reason: HOSPADM

## 2023-10-18 RX ORDER — ONDANSETRON 4 MG/1
8 TABLET, ORALLY DISINTEGRATING ORAL EVERY 8 HOURS PRN
Status: DISCONTINUED | OUTPATIENT
Start: 2023-10-18 | End: 2023-10-25 | Stop reason: HOSPADM

## 2023-10-18 RX ORDER — HYDROMORPHONE HYDROCHLORIDE 2 MG/ML
1 INJECTION, SOLUTION INTRAMUSCULAR; INTRAVENOUS; SUBCUTANEOUS EVERY 4 HOURS PRN
Status: DISCONTINUED | OUTPATIENT
Start: 2023-10-18 | End: 2023-10-25 | Stop reason: HOSPADM

## 2023-10-18 RX ORDER — KETOROLAC TROMETHAMINE 30 MG/ML
INJECTION, SOLUTION INTRAMUSCULAR; INTRAVENOUS
Status: DISCONTINUED | OUTPATIENT
Start: 2023-10-18 | End: 2023-10-18

## 2023-10-18 RX ORDER — GENTAMICIN SULFATE 40 MG/ML
2.5 INJECTION, SOLUTION INTRAMUSCULAR; INTRAVENOUS
Status: CANCELLED | OUTPATIENT
Start: 2023-10-18

## 2023-10-18 RX ORDER — CLINDAMYCIN PHOSPHATE 900 MG/50ML
900 INJECTION, SOLUTION INTRAVENOUS
Status: DISCONTINUED | OUTPATIENT
Start: 2023-10-18 | End: 2023-10-20

## 2023-10-18 RX ORDER — HYDRALAZINE HYDROCHLORIDE 25 MG/1
25 TABLET, FILM COATED ORAL 2 TIMES DAILY
COMMUNITY
Start: 2023-09-30

## 2023-10-18 RX ADMIN — DEXTROSE MONOHYDRATE: 5 INJECTION INTRAVENOUS at 06:10

## 2023-10-18 RX ADMIN — HYDROCODONE BITARTRATE AND ACETAMINOPHEN 1 TABLET: 5; 325 TABLET ORAL at 04:10

## 2023-10-18 RX ADMIN — KETOROLAC TROMETHAMINE 30 MG: 30 INJECTION, SOLUTION INTRAMUSCULAR at 11:10

## 2023-10-18 RX ADMIN — HYDROMORPHONE HYDROCHLORIDE 0.5 MG: 2 INJECTION INTRAMUSCULAR; INTRAVENOUS; SUBCUTANEOUS at 12:10

## 2023-10-18 RX ADMIN — GENTAMICIN SULFATE 240 MG: 40 INJECTION, SOLUTION INTRAMUSCULAR; INTRAVENOUS at 07:10

## 2023-10-18 RX ADMIN — CLINDAMYCIN PHOSPHATE 900 MG: 900 INJECTION, SOLUTION INTRAVENOUS at 09:10

## 2023-10-18 RX ADMIN — CEFAZOLIN 2 G: 1 INJECTION, POWDER, FOR SOLUTION INTRAMUSCULAR; INTRAVENOUS; PARENTERAL at 11:10

## 2023-10-18 RX ADMIN — CLINDAMYCIN PHOSPHATE 900 MG: 900 INJECTION, SOLUTION INTRAVENOUS at 04:10

## 2023-10-18 RX ADMIN — MIDAZOLAM 2 MG: 1 INJECTION INTRAMUSCULAR; INTRAVENOUS at 11:10

## 2023-10-18 RX ADMIN — PROPOFOL 200 MG: 10 INJECTION, EMULSION INTRAVENOUS at 11:10

## 2023-10-18 RX ADMIN — LIDOCAINE HYDROCHLORIDE 50 MG: 20 INJECTION, SOLUTION INTRAVENOUS at 11:10

## 2023-10-18 RX ADMIN — HYDROCODONE BITARTRATE AND ACETAMINOPHEN 1 TABLET: 5; 325 TABLET ORAL at 08:10

## 2023-10-18 RX ADMIN — CEFAZOLIN 1 G: 1 INJECTION, POWDER, FOR SOLUTION INTRAMUSCULAR; INTRAVENOUS at 07:10

## 2023-10-18 RX ADMIN — ONDANSETRON 4 MG: 2 INJECTION INTRAMUSCULAR; INTRAVENOUS at 11:10

## 2023-10-18 RX ADMIN — FENTANYL CITRATE 100 MCG: 50 INJECTION INTRAMUSCULAR; INTRAVENOUS at 11:10

## 2023-10-18 RX ADMIN — HYDRALAZINE HYDROCHLORIDE 10 MG: 20 INJECTION INTRAMUSCULAR; INTRAVENOUS at 12:10

## 2023-10-18 RX ADMIN — DEXAMETHASONE SODIUM PHOSPHATE 8 MG: 4 INJECTION, SOLUTION INTRA-ARTICULAR; INTRALESIONAL; INTRAMUSCULAR; INTRAVENOUS; SOFT TISSUE at 11:10

## 2023-10-18 RX ADMIN — PROCHLORPERAZINE EDISYLATE 5 MG: 5 INJECTION INTRAMUSCULAR; INTRAVENOUS at 04:10

## 2023-10-18 RX ADMIN — SODIUM CHLORIDE: 9 INJECTION, SOLUTION INTRAVENOUS at 11:10

## 2023-10-18 NOTE — PROGRESS NOTES
1153 RECEIVED TO RR WITH ORAL AIRWAY IN PLACE. O2 VIA FM. HOB ELEVATED. ABDOMEN SOFT, JOSE PAD IN PLACE, NO VAGINAL BLEEDING NOTED. IV INFUSING RIGHT WRIST 20G. CATH. SCD HOSE IN PROGRESS. OBSERVING CLOSELY. SEE FLOW SHEET.      1200 ORAL AIRWAY REMOVED, ORIENTATION GIVEN. NO C/O PAIN. SEE FLOW SHEET.    1220 DIASTOLIC PRESSURE ELEVATED. C/O ABDOMINAL PAIN. DILAUDID TITRATED FOR RELIEF. DR. MEZA AT BEDSIDE AWARE OF B/P READINGS.      1240 EMOTIONAL, CRYING. STATES PAIN BETTER. B/P ELEVATED DR. MEZA MADE AWARE. APRESOLINE ORDERED AND GIVEN.    1250 SMALL AREA OF RED DRAINAGE ON JOSE PAD. CLEAN PAD APPLIED.    1300 CALLED DR. PURDY SEVERAL TIMES FOR POST-OP ORDERS, PHONE GOING TO VOICE MAIL. NO ANSWER AT OFFICE.      1320 SPOKE WITH DR. PURDY VIA PHONE, TO DO ORDERS IN 10 MINUTES, OUT OF THE HOSPITAL.    1400 ORDERS RECEIVED FROM DR. PURDY. SMALL AMOUNT OF RED DRAINAGE ON JOSE PAD. CLEAN PAD APPLIED. TRANSFERRED TO ROOM WITHOUT DISTRESS NOTED.

## 2023-10-18 NOTE — ANESTHESIA PROCEDURE NOTES
Intubation    Date/Time: 10/18/2023 11:12 AM    Performed by: Radha Mg CRNA  Authorized by: Radha Mg CRNA    Intubation:     Induction:  Intravenous    Intubated:  Postinduction    Mask Ventilation:  Easy mask    Attempts:  1    Attempted By:  CRNA    Difficult Airway Encountered?: No      Complications:  None    Airway Device:  Supraglottic airway/LMA    Airway Device Size:  4.0    Style/Cuff Inflation:  Cuffed (inflated to minimal occlusive pressure)    Placement Verified By:  Capnometry    Complicating Factors:  None    Findings Post-Intubation:  Atraumatic/condition of teeth unchanged and BS equal bilateral

## 2023-10-18 NOTE — TRANSFER OF CARE
Anesthesia Transfer of Care Note    Patient: Linda Lopez    Procedure(s) Performed: Procedure(s) (LRB):  HYSTEROSCOPY, WITH DILATION AND CURETTAGE OF UTERUS (N/A)    Patient location: PACU    Anesthesia Type: general    Transport from OR: Transported from OR on 6-10 L/min O2 by face mask with adequate spontaneous ventilation    Post pain: adequate analgesia    Post assessment: no apparent anesthetic complications    Post vital signs: stable    Level of consciousness: responds to stimulation and awake    Nausea/Vomiting: no nausea/vomiting    Complications: none    Transfer of care protocol was followedComments: Good SV continue, NAD noted, VSS, RTRN      Last vitals:   Visit Vitals  /89   Pulse 60   Temp 36.6 °C (97.9 °F)   Resp 18   LMP  (LMP Unknown)   SpO2 100%

## 2023-10-18 NOTE — ANESTHESIA POSTPROCEDURE EVALUATION
Anesthesia Post Evaluation    Patient: Linda Lopez    Procedure(s) Performed: Procedure(s) (LRB):  HYSTEROSCOPY, WITH DILATION AND CURETTAGE OF UTERUS (N/A)  DILATION AND CURETTAGE, UTERUS, USING SUCTION    Final Anesthesia Type: general      Patient location during evaluation: PACU  Patient participation: Yes- Able to Participate  Level of consciousness: awake and sedated  Post-procedure vital signs: reviewed and stable  Pain management: adequate  Airway patency: patent    PONV status at discharge: No PONV  Anesthetic complications: no      Cardiovascular status: blood pressure returned to baseline  Respiratory status: unassisted  Hydration status: euvolemic  Follow-up not needed.          Vitals Value Taken Time   /91 10/18/23 1400   Temp 36.6 °C (97.9 °F) 10/18/23 1156   Pulse 74 10/18/23 1400   Resp 11 10/18/23 1400   SpO2 100 % 10/18/23 1400   Vitals shown include unvalidated device data.      Event Time   Out of Recovery 14:00:00         Pain/Fabricio Score: Pain Rating Prior to Med Admin: 5 (10/18/2023 12:30 PM)  Fabricio Score: 10 (10/18/2023  1:30 PM)

## 2023-10-18 NOTE — OP NOTE
Date10/18/23    Operative report:    Pre op Diagnosis:  Abnormal vaginal bleeding    Post op Diagnosis:  Same    Procedure:  D&C, hysteroscopy    Surgeon: Dr Thomas Dominguez    Anesthesia: General    Findings:  Large amount of necrotic appearing material    Complication(s): none    Condition:good    Estimated Blood loss: less than 50 mL              .

## 2023-10-18 NOTE — PROGRESS NOTES
Pharmacokinetic Initial Assessment: Gentamicin    Assessment:  Weight utilized for dose calculation: Ideal Body Weight  Dosing method utilized: extended interval dosing    Plan: Extended interval dosing regimen: Gentamicin 240 mg IV q36h with a gent trough ordered for 10/20 at 1730    Pharmacy will continue to monitor.    Please contact pharmacy at extension [phone] with any questions regarding this assessment.    Patient brief summary:  Linda Lopez is a 53 y.o. female initiated on aminoglycoside therapy for treatment of suspected urinary tract infection    Drug Allergies:   Review of patient's allergies indicates:  No Known Allergies    Actual Body Weight:   72.6 kg    Adjust Body Weight:   70.1 kg    Ideal Body Weight:  68.5 kg    Renal Function:   Estimated Creatinine Clearance: 54.5 mL/min (A) (based on SCr of 1.32 mg/dL (H)).,     Dialysis Method (if applicable):  N/A    CBC (last 72 hours):  Recent Labs   Lab Result Units 10/16/23  1007   WBC K/uL 17.79*   Hemoglobin g/dL 13.7   Hematocrit % 41.0   Platelet Count K/uL 443*   Lymphocytes % % 14.0*   Monocytes % % 7.7*   Eosinophils % % 0.4*   Basophils % % 0.4   Diff Type  Auto       Metabolic Panel (last 72 hours):  Recent Labs   Lab Result Units 10/18/23  1522   Creatinine mg/dL 1.32*       Microbiologic Results:  Microbiology Results (last 7 days)       ** No results found for the last 168 hours. **

## 2023-10-18 NOTE — ANESTHESIA PREPROCEDURE EVALUATION
10/18/2023  Linda Lopez is a 53 y.o., female.      Pre-op Assessment    I have reviewed the Patient Summary Reports.     I have reviewed the Nursing Notes. I have reviewed the NPO Status.   I have reviewed the Medications.     Review of Systems  Anesthesia Hx:  No problems with previous Anesthesia    Social:  Non-Smoker, No Alcohol Use    Hematology/Oncology:  Hematology Normal   Oncology Normal     EENT/Dental:EENT/Dental Normal   Cardiovascular:   Hypertension    Pulmonary:  Pulmonary Normal    Renal/:  Renal/ Normal     Hepatic/GI:  Hepatic/GI Normal    Musculoskeletal:  Musculoskeletal Normal    Neurological:  Neurology Normal    Endocrine:  Endocrine Normal    Dermatological:  Skin Normal    Psych:  Psychiatric Normal           Physical Exam  General: Well nourished    Airway:  Mallampati: III / III  Mouth Opening: Normal  TM Distance: > 6 cm  Tongue: Normal  Neck ROM: Normal ROM    Chest/Lungs:  Clear to auscultation, Normal Respiratory Rate    Heart:  Rate: Normal  Rhythm: Regular Rhythm        Anesthesia Plan  Type of Anesthesia, risks & benefits discussed:    Anesthesia Type: Gen Supraglottic Airway  Intra-op Monitoring Plan: Standard ASA Monitors  Post Op Pain Control Plan: multimodal analgesia  Induction:  IV  Informed Consent: Informed consent signed with the Patient and all parties understand the risks and agree with anesthesia plan.  All questions answered. Patient consented to blood products? Yes  ASA Score: 2  Day of Surgery Review of History & Physical: H&P Update referred to the surgeon/provider.I have interviewed and examined the patient. I have reviewed the patient's H&P dated: There are no significant changes. H&P completed by Anesthesiologist.    Ready For Surgery From Anesthesia Perspective.     .

## 2023-10-18 NOTE — H&P
This note has been moved to another encounter. If you have any questions, please contact HIM Chart Correction at [phone].

## 2023-10-18 NOTE — H&P
"History & Physical    SUBJECTIVE:     History of Present Illness:  Patient is a 53 y.o. female presents for D and C, hysteroscopy.  Chief Complaint   Patient presents with    Pre-op Exam       Review of patient's allergies indicates:  No Known Allergies    Current Outpatient Medications   Medication Sig Dispense Refill    acetaminophen-codeine 300-30mg (TYLENOL #3) 300-30 mg Tab Take 1 tablet by mouth every 4 (four) hours as needed. for pain.      doxycycline (VIBRAMYCIN) 100 MG Cap Take 1 capsule (100 mg total) by mouth 2 (two) times daily. 20 capsule 0    hydrALAZINE (APRESOLINE) 25 MG tablet Take 25 mg by mouth 2 (two) times daily.      ibuprofen (ADVIL,MOTRIN) 800 MG tablet Take 1 tablet (800 mg total) by mouth every 6 (six) hours as needed for Pain. 20 tablet 0    traMADoL (ULTRAM) 50 mg tablet Take 1 tablet (50 mg total) by mouth every 6 (six) hours as needed for Pain. 12 tablet 0     No current facility-administered medications for this visit.       Past Medical History:   Diagnosis Date    Hypertension      Past Surgical History:   Procedure Laterality Date    BILATERAL TUBAL LIGATION Bilateral     CHOLECYSTECTOMY       History reviewed. No pertinent family history.  Social History     Tobacco Use    Smoking status: Every Day     Current packs/day: 0.50     Types: Cigarettes    Smokeless tobacco: Never   Substance Use Topics    Alcohol use: Yes     Comment: occasional    Drug use: Yes     Types: Marijuana     Comment: month ago        Review of Systems:  Review of Systems   Constitutional:  Negative for fever.   Genitourinary:  Negative for dysuria.       OBJECTIVE:     Vital Signs (Most Recent)  BP: (!) 160/100 (10/18/23 0907)  5' 10" (1.778 m)        Physical Exam:  Physical Exam  Cardiovascular:      Rate and Rhythm: Normal rate and regular rhythm.   Pulmonary:      Effort: Pulmonary effort is normal.      Breath sounds: Normal breath sounds.   Abdominal:      Palpations: Abdomen is soft. "   Genitourinary:     General: Normal vulva.      Rectum: Normal.      Comments: Vagina: Normal  Cervix: Clean  Uterus: Mildly enlarged and irregular  Adnexa:  No masses or tenderness  Skin:     General: Skin is warm.   Neurological:      Mental Status: She is alert.   Psychiatric:         Mood and Affect: Mood normal.         Laboratory  Reviewed    Diagnostic Results:  Reviewed    ASSESSMENT/PLAN:     Abnormal uterine bleeding, pelvic pain, uterine leiomyomata, thickened endometrium    PLAN:Plan     D&C, hysteroscopy

## 2023-10-19 LAB
BASOPHILS # BLD AUTO: 0.03 K/UL (ref 0–0.2)
BASOPHILS NFR BLD AUTO: 0.1 % (ref 0–1)
DIFFERENTIAL METHOD BLD: ABNORMAL
EOSINOPHIL # BLD AUTO: 0 K/UL (ref 0–0.5)
EOSINOPHIL NFR BLD AUTO: 0 % (ref 1–4)
ERYTHROCYTE [DISTWIDTH] IN BLOOD BY AUTOMATED COUNT: 13.5 % (ref 11.5–14.5)
HCT VFR BLD AUTO: 30.9 % (ref 38–47)
HGB BLD-MCNC: 10.5 G/DL (ref 12–16)
IMM GRANULOCYTES # BLD AUTO: 0.1 K/UL (ref 0–0.04)
IMM GRANULOCYTES NFR BLD: 0.5 % (ref 0–0.4)
LYMPHOCYTES # BLD AUTO: 1.1 K/UL (ref 1–4.8)
LYMPHOCYTES NFR BLD AUTO: 5.5 % (ref 27–41)
MCH RBC QN AUTO: 29.9 PG (ref 27–31)
MCHC RBC AUTO-ENTMCNC: 34 G/DL (ref 32–36)
MCV RBC AUTO: 88 FL (ref 80–96)
MONOCYTES # BLD AUTO: 0.74 K/UL (ref 0–0.8)
MONOCYTES NFR BLD AUTO: 3.7 % (ref 2–6)
MPC BLD CALC-MCNC: 11.8 FL (ref 9.4–12.4)
NEUTROPHILS # BLD AUTO: 18.09 K/UL (ref 1.8–7.7)
NEUTROPHILS NFR BLD AUTO: 90.2 % (ref 53–65)
NRBC # BLD AUTO: 0 X10E3/UL
NRBC, AUTO (.00): 0 %
PLATELET # BLD AUTO: 302 K/UL (ref 150–400)
RBC # BLD AUTO: 3.51 M/UL (ref 4.2–5.4)
WBC # BLD AUTO: 20.06 K/UL (ref 4.5–11)

## 2023-10-19 PROCEDURE — 58558 PR HYSTEROSCOPY,W/ENDO BX: ICD-10-PCS | Mod: ,,, | Performed by: OBSTETRICS & GYNECOLOGY

## 2023-10-19 PROCEDURE — 25000003 PHARM REV CODE 250: Performed by: OBSTETRICS & GYNECOLOGY

## 2023-10-19 PROCEDURE — 85025 COMPLETE CBC W/AUTO DIFF WBC: CPT | Performed by: OBSTETRICS & GYNECOLOGY

## 2023-10-19 PROCEDURE — 63600175 PHARM REV CODE 636 W HCPCS

## 2023-10-19 PROCEDURE — 63600175 PHARM REV CODE 636 W HCPCS: Performed by: OBSTETRICS & GYNECOLOGY

## 2023-10-19 PROCEDURE — 58558 HYSTEROSCOPY BIOPSY: CPT | Mod: ,,, | Performed by: OBSTETRICS & GYNECOLOGY

## 2023-10-19 PROCEDURE — 25000003 PHARM REV CODE 250

## 2023-10-19 RX ADMIN — CLINDAMYCIN PHOSPHATE 900 MG: 900 INJECTION, SOLUTION INTRAVENOUS at 03:10

## 2023-10-19 RX ADMIN — ONDANSETRON 8 MG: 4 TABLET, ORALLY DISINTEGRATING ORAL at 08:10

## 2023-10-19 RX ADMIN — CEFAZOLIN 2 G: 2 INJECTION, POWDER, FOR SOLUTION INTRAMUSCULAR; INTRAVENOUS at 10:10

## 2023-10-19 RX ADMIN — CLINDAMYCIN PHOSPHATE 900 MG: 900 INJECTION, SOLUTION INTRAVENOUS at 08:10

## 2023-10-19 RX ADMIN — HYDROCODONE BITARTRATE AND ACETAMINOPHEN 1 TABLET: 5; 325 TABLET ORAL at 08:10

## 2023-10-19 RX ADMIN — HYDROMORPHONE HYDROCHLORIDE 1 MG: 2 INJECTION INTRAMUSCULAR; INTRAVENOUS; SUBCUTANEOUS at 03:10

## 2023-10-19 RX ADMIN — HYDROMORPHONE HYDROCHLORIDE 1 MG: 2 INJECTION INTRAMUSCULAR; INTRAVENOUS; SUBCUTANEOUS at 09:10

## 2023-10-19 RX ADMIN — CLINDAMYCIN PHOSPHATE 900 MG: 900 INJECTION, SOLUTION INTRAVENOUS at 11:10

## 2023-10-19 RX ADMIN — CEFAZOLIN 1 G: 1 INJECTION, POWDER, FOR SOLUTION INTRAMUSCULAR; INTRAVENOUS at 12:10

## 2023-10-19 RX ADMIN — CEFAZOLIN 1 G: 1 INJECTION, POWDER, FOR SOLUTION INTRAMUSCULAR; INTRAVENOUS at 06:10

## 2023-10-19 RX ADMIN — CEFAZOLIN 1 G: 1 INJECTION, POWDER, FOR SOLUTION INTRAMUSCULAR; INTRAVENOUS at 11:10

## 2023-10-19 RX ADMIN — HYDROCODONE BITARTRATE AND ACETAMINOPHEN 1 TABLET: 5; 325 TABLET ORAL at 01:10

## 2023-10-19 NOTE — SUBJECTIVE & OBJECTIVE
Interval History:  The patient is 1 day postop D&C hysteroscopy.  She has known uterine fibroids with a thickened endometrium.  She was also quite tender with an elevated white blood count.  The patient was treated with IM antibiotics and her pain was not improving.  She also had a minimally positive quantitative hCG and it was felt she possibly had retained products of conception.  At the time for D and C, there was some necrotic appearing tissue and the specimen possibly representing retained products of conception or malignant material.  Postoperatively, she was placed on triple antibiotics to treat her presumed endometritis.    Scheduled Meds:   ceFAZolin (ANCEF) IVPB  1 g Intravenous Q6H    clindamycin (CLEOCIN) IVPB  900 mg Intravenous Q6H    gentamicin  240 mg Intravenous Q36H     Continuous Infusions:  PRN Meds:acetaminophen, diphenhydrAMINE, diphenhydrAMINE, Pharmacy to dose Aminoglycosides consult **AND** gentamicin - pharmacy to dose, HYDROcodone-acetaminophen, HYDROmorphone, ondansetron, prochlorperazine    Review of patient's allergies indicates:  No Known Allergies    Objective:     Vital Signs (Most Recent):  Temp: 98.7 °F (37.1 °C) (10/19/23 0645)  Pulse: 74 (10/19/23 0645)  Resp: 18 (10/19/23 0645)  BP: 110/67 (10/19/23 0645)  SpO2: 98 % (10/19/23 0645) Vital Signs (24h Range):  Temp:  [97.9 °F (36.6 °C)-98.7 °F (37.1 °C)] 98.7 °F (37.1 °C)  Pulse:  [60-81] 74  Resp:  [12-20] 18  SpO2:  [98 %-100 %] 98 %  BP: (110-190)/() 110/67     Weight: 72.6 kg (160 lb 0.9 oz)  Body mass index is 22.97 kg/m².  No LMP recorded (lmp unknown). Patient is perimenopausal.    I&O (Last 24H):    Intake/Output Summary (Last 24 hours) at 10/19/2023 0825  Last data filed at 10/18/2023 1400  Gross per 24 hour   Intake 920 ml   Output 100 ml   Net 820 ml         Laboratory:  CBC:   Recent Labs   Lab 10/19/23  0447   WBC 20.06*   RBC 3.51*   HGB 10.5*   HCT 30.9*      MCV 88.0   MCH 29.9   MCHC 34.0        Diagnostic Results:  Pelvic ultrasound is ordered for this morning     Physical Exam:   Constitutional: She appears well-developed and well-nourished.       Cardiovascular:  Normal rate and regular rhythm.             Pulmonary/Chest: Effort normal and breath sounds normal.        Abdominal: Soft. There is abdominal tenderness.                  Skin: Skin is warm.    Psychiatric: She has a normal mood and affect.        Review of Systems    The patient's pain is improving.  Her white blood count has elevated to 20,000 this morning.  The plan is to continue her triple IV antibiotic therapy.  A pelvic ultrasound has been ordered for this morning and we will repeat a CBC in the morning.   is following the patient in my absence.

## 2023-10-19 NOTE — OP NOTE
OPERATIVE NOTE    DATE OF PROCEDURE: 10/18/23    SURGEON: Thomas Dominguez M.D.      PREOPERATIVE DIAGNOSIS: Menometrorrhagia, thickened endometrium    POSTOPERATIVE DIAGNOSIS:  Same.     PROCEDURE: Dilation and curettage, hysteroscopy    ANESTHESIA: systemic    FINDINGS: Uterus sounded to 9 cm.     ESTIMATED BLOOD LOSS: 20 mL.     PROCEDURE IN DETAIL: After proper consents were explained and obtained, the   patient was taken to the Operating Room where anesthesia was obtained without difficulty. She was then placed in dorsal lithotomy position in the [facility].  The patient was also given Ancef intravenously for antibiotic prophylaxis.  The patient was then prepped and draped in normal sterile fashion. A weighted speculum was placed into the vagina. Right angle used to visualize the cervix, which was grasped at the anterior lip with the single tooth tenaculum.  The cervix was mildly irregular some discoloration at the 7 o'clock position.  The cervix was serially dilated to # 20 Hanks dilator. The uterus had sounded to 9 cm.     The hysteroscope was then inserted into the endocervical canal which appeared grossly normal.  The hysteroscope was then advanced into the endometrial cavity and thickened mildly necrotic appearing endometrial tissue was encountered.  The cavity also was somewhat irregular secondary to her multiple uterine leiomyomata.  The hysteroscope was then removed.  The endocervical canal was then gently curetted with a serrated curette and a moderate amount of tissue was obtained.  This was sent as a separate specimen the pathology.  All quadrants of the endometrial cavity was then gently curetted with a serrated curette and a large amount of tissue was obtained.  A 9. Suction curette was then inserted into the endometrial cavity and again a large amount of tissue was obtained.  The specimen was somewhat bloody with some necrotic appearing tissue also.  All quadrants of the endometrial cavity were then  curetted with a serrated curette on minimal amount of tissue was obtained at that point.  The hysteroscope was then reinserted into the endometrial cavity and there was not significant tissue remaining in the endometrial cavity.  Hysteroscope was then removed.  Estimated blood loss was 20 cc.    The patient tolerated the procedure well. All counts were correct x2. The patient was taken to Recovery area in stable condition.

## 2023-10-19 NOTE — PLAN OF CARE
Problem: Adult Inpatient Plan of Care  Goal: Optimal Comfort and Wellbeing  Outcome: Ongoing, Progressing     Problem: Infection  Goal: Absence of Infection Signs and Symptoms  Outcome: Ongoing, Progressing

## 2023-10-20 LAB
BASOPHILS # BLD AUTO: 0.04 K/UL (ref 0–0.2)
BASOPHILS NFR BLD AUTO: 0.2 % (ref 0–1)
BUN SERPL-MCNC: 14 MG/DL (ref 7–18)
BUN/CREAT SERPL: 9 (ref 6–20)
CREAT SERPL-MCNC: 1.52 MG/DL (ref 0.55–1.02)
CRP SERPL-MCNC: 25.5 MG/DL (ref 0–0.8)
DHEA SERPL-MCNC: NORMAL
DIFFERENTIAL METHOD BLD: ABNORMAL
EGFR (NO RACE VARIABLE) (RUSH/TITUS): 41 ML/MIN/1.73M2
EOSINOPHIL # BLD AUTO: 0.01 K/UL (ref 0–0.5)
EOSINOPHIL NFR BLD AUTO: 0 % (ref 1–4)
ERYTHROCYTE [DISTWIDTH] IN BLOOD BY AUTOMATED COUNT: 13.7 % (ref 11.5–14.5)
ERYTHROCYTE [SEDIMENTATION RATE] IN BLOOD BY WESTERGREN METHOD: 72 MM/HR (ref 0–30)
ESTROGEN SERPL-MCNC: NORMAL PG/ML
GENTAMICIN TROUGH SERPL-MCNC: <0.2 ΜG/ML (ref 0.5–1.9)
HCG SERPL-ACNC: 4 MIU/ML
HCT VFR BLD AUTO: 30.8 % (ref 38–47)
HGB BLD-MCNC: 10.6 G/DL (ref 12–16)
IMM GRANULOCYTES # BLD AUTO: 0.24 K/UL (ref 0–0.04)
IMM GRANULOCYTES NFR BLD: 0.9 % (ref 0–0.4)
INSULIN SERPL-ACNC: NORMAL U[IU]/ML
IRON SATN MFR SERPL: 5 % (ref 14–50)
IRON SERPL-MCNC: 7 ΜG/DL (ref 50–170)
LAB AP GROSS DESCRIPTION: NORMAL
LAB AP LABORATORY NOTES: NORMAL
LYMPHOCYTES # BLD AUTO: 1.08 K/UL (ref 1–4.8)
LYMPHOCYTES NFR BLD AUTO: 4.2 % (ref 27–41)
LYMPHOCYTES NFR BLD MANUAL: 5 % (ref 27–41)
MCH RBC QN AUTO: 30.2 PG (ref 27–31)
MCHC RBC AUTO-ENTMCNC: 34.4 G/DL (ref 32–36)
MCV RBC AUTO: 87.7 FL (ref 80–96)
MONOCYTES # BLD AUTO: 1.11 K/UL (ref 0–0.8)
MONOCYTES NFR BLD AUTO: 4.3 % (ref 2–6)
MONOCYTES NFR BLD MANUAL: 4 % (ref 2–6)
MPC BLD CALC-MCNC: 12.4 FL (ref 9.4–12.4)
NEUTROPHILS # BLD AUTO: 23.23 K/UL (ref 1.8–7.7)
NEUTROPHILS NFR BLD AUTO: 90.4 % (ref 53–65)
NEUTS BAND NFR BLD MANUAL: 1 % (ref 1–5)
NEUTS SEG NFR BLD MANUAL: 90 % (ref 50–62)
NRBC # BLD AUTO: 0 X10E3/UL
NRBC, AUTO (.00): 0 %
PLATELET # BLD AUTO: 292 K/UL (ref 150–400)
PLATELET MORPHOLOGY: ABNORMAL
RBC # BLD AUTO: 3.51 M/UL (ref 4.2–5.4)
RBC MORPH BLD: NORMAL
T3RU NFR SERPL: NORMAL %
TIBC SERPL-MCNC: 136 ΜG/DL (ref 250–450)
WBC # BLD AUTO: 25.71 K/UL (ref 4.5–11)

## 2023-10-20 PROCEDURE — 83550 IRON BINDING TEST: CPT | Performed by: OBSTETRICS & GYNECOLOGY

## 2023-10-20 PROCEDURE — 84520 ASSAY OF UREA NITROGEN: CPT | Performed by: OBSTETRICS & GYNECOLOGY

## 2023-10-20 PROCEDURE — 85025 COMPLETE CBC W/AUTO DIFF WBC: CPT | Performed by: OBSTETRICS & GYNECOLOGY

## 2023-10-20 PROCEDURE — 99234 HOSP IP/OBS SM DT SF/LOW 45: CPT | Mod: GT,,, | Performed by: OBSTETRICS & GYNECOLOGY

## 2023-10-20 PROCEDURE — 99234 PR OBSERV/HOSP SAME DATE,LEVL III: ICD-10-PCS | Mod: GT,,, | Performed by: OBSTETRICS & GYNECOLOGY

## 2023-10-20 PROCEDURE — 85651 RBC SED RATE NONAUTOMATED: CPT | Performed by: OBSTETRICS & GYNECOLOGY

## 2023-10-20 PROCEDURE — 80170 ASSAY OF GENTAMICIN: CPT | Performed by: OBSTETRICS & GYNECOLOGY

## 2023-10-20 PROCEDURE — 63600175 PHARM REV CODE 636 W HCPCS: Performed by: OBSTETRICS & GYNECOLOGY

## 2023-10-20 PROCEDURE — 84702 CHORIONIC GONADOTROPIN TEST: CPT | Performed by: OBSTETRICS & GYNECOLOGY

## 2023-10-20 PROCEDURE — 25000003 PHARM REV CODE 250: Performed by: OBSTETRICS & GYNECOLOGY

## 2023-10-20 PROCEDURE — 83540 ASSAY OF IRON: CPT | Performed by: OBSTETRICS & GYNECOLOGY

## 2023-10-20 PROCEDURE — 82565 ASSAY OF CREATININE: CPT | Performed by: OBSTETRICS & GYNECOLOGY

## 2023-10-20 PROCEDURE — 11000001 HC ACUTE MED/SURG PRIVATE ROOM

## 2023-10-20 PROCEDURE — 86140 C-REACTIVE PROTEIN: CPT | Performed by: OBSTETRICS & GYNECOLOGY

## 2023-10-20 RX ORDER — METRONIDAZOLE 500 MG/100ML
500 INJECTION, SOLUTION INTRAVENOUS
Status: DISCONTINUED | OUTPATIENT
Start: 2023-10-20 | End: 2023-10-25 | Stop reason: HOSPADM

## 2023-10-20 RX ORDER — ESCITALOPRAM OXALATE 10 MG/1
10 TABLET ORAL DAILY
Status: DISCONTINUED | OUTPATIENT
Start: 2023-10-20 | End: 2023-10-25 | Stop reason: HOSPADM

## 2023-10-20 RX ORDER — LANOLIN ALCOHOL/MO/W.PET/CERES
1 CREAM (GRAM) TOPICAL 2 TIMES DAILY
Status: DISCONTINUED | OUTPATIENT
Start: 2023-10-20 | End: 2023-10-21

## 2023-10-20 RX ADMIN — HYDROCODONE BITARTRATE AND ACETAMINOPHEN 1 TABLET: 5; 325 TABLET ORAL at 02:10

## 2023-10-20 RX ADMIN — CLINDAMYCIN PHOSPHATE 900 MG: 900 INJECTION, SOLUTION INTRAVENOUS at 06:10

## 2023-10-20 RX ADMIN — HYDROMORPHONE HYDROCHLORIDE 1 MG: 2 INJECTION INTRAMUSCULAR; INTRAVENOUS; SUBCUTANEOUS at 09:10

## 2023-10-20 RX ADMIN — FERROUS SULFATE TAB 325 MG (65 MG ELEMENTAL FE) 1 EACH: 325 (65 FE) TAB at 09:10

## 2023-10-20 RX ADMIN — CEFAZOLIN 2 G: 2 INJECTION, POWDER, FOR SOLUTION INTRAMUSCULAR; INTRAVENOUS at 06:10

## 2023-10-20 RX ADMIN — FERROUS SULFATE TAB 325 MG (65 MG ELEMENTAL FE) 1 EACH: 325 (65 FE) TAB at 12:10

## 2023-10-20 RX ADMIN — CEFAZOLIN 2 G: 2 INJECTION, POWDER, FOR SOLUTION INTRAMUSCULAR; INTRAVENOUS at 02:10

## 2023-10-20 RX ADMIN — METRONIDAZOLE 500 MG: 500 INJECTION, SOLUTION INTRAVENOUS at 12:10

## 2023-10-20 RX ADMIN — ESCITALOPRAM OXALATE 10 MG: 10 TABLET ORAL at 12:10

## 2023-10-20 RX ADMIN — METRONIDAZOLE 500 MG: 500 INJECTION, SOLUTION INTRAVENOUS at 07:10

## 2023-10-20 RX ADMIN — HYDROCODONE BITARTRATE AND ACETAMINOPHEN 1 TABLET: 5; 325 TABLET ORAL at 06:10

## 2023-10-20 RX ADMIN — CEFAZOLIN 2 G: 2 INJECTION, POWDER, FOR SOLUTION INTRAMUSCULAR; INTRAVENOUS at 11:10

## 2023-10-20 NOTE — PROGRESS NOTES
Pharmacy Consult    Consulted to assist in the management of Gent therapy.  Patient is currently receiving Gent 240mg iv q36hr.  Trough drawn at 1730  on 10/20. Reported as <0.2 mcg/ml.  BUN/SCR = 14/1.52.  Since trough Is in appropriate range, no changes needed at this time.  Will continue to monitor and make adjustment as necessary.      Morales Walton, JUAN CARLOS.Ph.

## 2023-10-20 NOTE — PLAN OF CARE
[facility]  Initial Discharge Assessment       Primary Care Provider: No, Primary Doctor    Admission Diagnosis: Vagina bleeding [N93.9]  Menorrhagia [N92.0]    Admission Date: 10/18/2023  Expected Discharge Date:     Transition of Care Barriers: Unisured    Payor: /     Extended Emergency Contact Information  Primary Emergency Contact: Miriam Rojas  Mobile Phone: [phone]  Relation: Sister  Preferred language: English   needed? No    Discharge Plan A: Home  Discharge Plan B: Home      [id number], [place] - [address] [address] [facility]  [address] [facility] [address]  Phone: [phone] Fax: [phone]    [facility], [place] - [address] [place] [address]  [address] [place] [address]  Phone: [phone] Fax: [phone]      Initial Assessment (most recent)       Adult Discharge Assessment - 10/20/23 1445          Discharge Assessment    Assessment Type Discharge Planning Assessment     Confirmed/corrected address, phone number and insurance Yes     Confirmed Demographics Correct on Facesheet     Source of Information patient     Communicated MATT with patient/caregiver Date not available/Unable to determine     People in Home other relative(s)     Do you expect to return to your current living situation? No     Do you have help at home or someone to help you manage your care at home? No     Prior to hospitilization cognitive status: Unable to Assess     Current cognitive status: Alert/Oriented     Equipment Currently Used at Home none     Readmission within 30 days? No     Patient currently being followed by outpatient case management? No     Do you currently have service(s) that help you manage your care at home? No     Do you have prescription coverage? No     Do you have any problems affording any of your prescribed medications? Yes     If yes, what medications? NO INSURANCE, GAVE FINAINCIAL PACKET AND FREE CLINIC PACKET     How do you get  to doctors appointments? family or friend will provide;car, drives self     Are you on dialysis? No     Do you take coumadin? No     DME Needed Upon Discharge  none     Discharge Plan discussed with: Patient     Transition of Care Barriers Unisured     Discharge Plan A Home     Discharge Plan B Home        Physical Activity    On average, how many days per week do you engage in moderate to strenuous exercise (like a brisk walk)? 0 days     On average, how many minutes do you engage in exercise at this level? 0 min        Financial Resource Strain    How hard is it for you to pay for the very basics like food, housing, medical care, and heating? Very hard        Housing Stability    In the last 12 months, was there a time when you were not able to pay the mortgage or rent on time? No     In the last 12 months, how many places have you lived? 1     In the last 12 months, was there a time when you did not have a steady place to sleep or slept in a shelter (including now)? No        Transportation Needs    In the past 12 months, has lack of transportation kept you from medical appointments or from getting medications? No     In the past 12 months, has lack of transportation kept you from meetings, work, or from getting things needed for daily living? No        Food Insecurity    Within the past 12 months, you worried that your food would run out before you got the money to buy more. Sometimes true     Within the past 12 months, the food you bought just didn't last and you didn't have money to get more. Sometimes true        Stress    Do you feel stress - tense, restless, nervous, or anxious, or unable to sleep at night because your mind is troubled all the time - these days? Rather much        Social Connections    In a typical week, how many times do you talk on the phone with family, friends, or neighbors? More than three times a week     How often do you get together with friends or relatives? More than three times a  week     How often do you attend Temple or Hoahaoism services? More than 4 times per year     Are you , , , , never , or living with a partner? Never         Alcohol Use    Q1: How often do you have a drink containing alcohol? Never     Q2: How many drinks containing alcohol do you have on a typical day when you are drinking? Patient does not drink     Q3: How often do you have six or more drinks on one occasion? Never                      [facility]CD CONSULT FOR PT NOT HAVING INSURANCE. SPOKE WITH PT SHE WAS LIVING HOME WITH HER NEPHEW. Plans to return when medically stable. Pt has not been seen by financial dept yet. Gave pt financial assistance packet and free clinic packet. She does live in [place] and will need to get [place] from [place] office [place]. Will follow dc needs as arise.
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
## 2023-10-21 LAB
BASOPHILS # BLD AUTO: 0.04 K/UL (ref 0–0.2)
BASOPHILS NFR BLD AUTO: 0.2 % (ref 0–1)
BUN SERPL-MCNC: 11 MG/DL (ref 7–18)
BUN/CREAT SERPL: 10 (ref 6–20)
CREAT SERPL-MCNC: 1.11 MG/DL (ref 0.55–1.02)
DIFFERENTIAL METHOD BLD: ABNORMAL
EGFR (NO RACE VARIABLE) (RUSH/TITUS): 60 ML/MIN/1.73M2
EOSINOPHIL # BLD AUTO: 0.05 K/UL (ref 0–0.5)
EOSINOPHIL NFR BLD AUTO: 0.2 % (ref 1–4)
ERYTHROCYTE [DISTWIDTH] IN BLOOD BY AUTOMATED COUNT: 13.5 % (ref 11.5–14.5)
HCT VFR BLD AUTO: 27.7 % (ref 38–47)
HGB BLD-MCNC: 9.8 G/DL (ref 12–16)
IMM GRANULOCYTES # BLD AUTO: 0.14 K/UL (ref 0–0.04)
IMM GRANULOCYTES NFR BLD: 0.7 % (ref 0–0.4)
LYMPHOCYTES # BLD AUTO: 0.86 K/UL (ref 1–4.8)
LYMPHOCYTES NFR BLD AUTO: 4 % (ref 27–41)
MCH RBC QN AUTO: 30.4 PG (ref 27–31)
MCHC RBC AUTO-ENTMCNC: 35.4 G/DL (ref 32–36)
MCV RBC AUTO: 86 FL (ref 80–96)
MONOCYTES # BLD AUTO: 1.05 K/UL (ref 0–0.8)
MONOCYTES NFR BLD AUTO: 4.9 % (ref 2–6)
MPC BLD CALC-MCNC: 11.6 FL (ref 9.4–12.4)
NEUTROPHILS # BLD AUTO: 19.22 K/UL (ref 1.8–7.7)
NEUTROPHILS NFR BLD AUTO: 90 % (ref 53–65)
NRBC # BLD AUTO: 0 X10E3/UL
NRBC, AUTO (.00): 0 %
PLATELET # BLD AUTO: 276 K/UL (ref 150–400)
RBC # BLD AUTO: 3.22 M/UL (ref 4.2–5.4)
WBC # BLD AUTO: 21.36 K/UL (ref 4.5–11)

## 2023-10-21 PROCEDURE — 99234 PR OBSERV/HOSP SAME DATE,LEVL III: ICD-10-PCS | Mod: GT,,, | Performed by: OBSTETRICS & GYNECOLOGY

## 2023-10-21 PROCEDURE — 94761 N-INVAS EAR/PLS OXIMETRY MLT: CPT

## 2023-10-21 PROCEDURE — 11000001 HC ACUTE MED/SURG PRIVATE ROOM

## 2023-10-21 PROCEDURE — 25000003 PHARM REV CODE 250: Performed by: OBSTETRICS & GYNECOLOGY

## 2023-10-21 PROCEDURE — 99234 HOSP IP/OBS SM DT SF/LOW 45: CPT | Mod: GT,,, | Performed by: OBSTETRICS & GYNECOLOGY

## 2023-10-21 PROCEDURE — 82565 ASSAY OF CREATININE: CPT | Performed by: OBSTETRICS & GYNECOLOGY

## 2023-10-21 PROCEDURE — 63600175 PHARM REV CODE 636 W HCPCS: Performed by: OBSTETRICS & GYNECOLOGY

## 2023-10-21 PROCEDURE — 85025 COMPLETE CBC W/AUTO DIFF WBC: CPT | Performed by: OBSTETRICS & GYNECOLOGY

## 2023-10-21 PROCEDURE — 84520 ASSAY OF UREA NITROGEN: CPT | Performed by: OBSTETRICS & GYNECOLOGY

## 2023-10-21 RX ORDER — LANOLIN ALCOHOL/MO/W.PET/CERES
1 CREAM (GRAM) TOPICAL DAILY
Status: DISCONTINUED | OUTPATIENT
Start: 2023-10-21 | End: 2023-10-25 | Stop reason: HOSPADM

## 2023-10-21 RX ADMIN — GENTAMICIN SULFATE 240 MG: 40 INJECTION, SOLUTION INTRAMUSCULAR; INTRAVENOUS at 06:10

## 2023-10-21 RX ADMIN — HYDROCODONE BITARTRATE AND ACETAMINOPHEN 1 TABLET: 5; 325 TABLET ORAL at 08:10

## 2023-10-21 RX ADMIN — HYDROMORPHONE HYDROCHLORIDE 1 MG: 2 INJECTION INTRAMUSCULAR; INTRAVENOUS; SUBCUTANEOUS at 04:10

## 2023-10-21 RX ADMIN — FERROUS SULFATE TAB 325 MG (65 MG ELEMENTAL FE) 1 EACH: 325 (65 FE) TAB at 11:10

## 2023-10-21 RX ADMIN — CEFAZOLIN 2 G: 2 INJECTION, POWDER, FOR SOLUTION INTRAMUSCULAR; INTRAVENOUS at 06:10

## 2023-10-21 RX ADMIN — METRONIDAZOLE 500 MG: 500 INJECTION, SOLUTION INTRAVENOUS at 06:10

## 2023-10-21 RX ADMIN — THERA TABS 1 TABLET: TAB at 10:10

## 2023-10-21 RX ADMIN — ESCITALOPRAM OXALATE 10 MG: 10 TABLET ORAL at 08:10

## 2023-10-21 RX ADMIN — HYDROMORPHONE HYDROCHLORIDE 1 MG: 2 INJECTION INTRAMUSCULAR; INTRAVENOUS; SUBCUTANEOUS at 09:10

## 2023-10-21 RX ADMIN — METRONIDAZOLE 500 MG: 500 INJECTION, SOLUTION INTRAVENOUS at 11:10

## 2023-10-21 RX ADMIN — CEFAZOLIN 2 G: 2 INJECTION, POWDER, FOR SOLUTION INTRAMUSCULAR; INTRAVENOUS at 02:10

## 2023-10-21 RX ADMIN — HYDROCODONE BITARTRATE AND ACETAMINOPHEN 1 TABLET: 5; 325 TABLET ORAL at 07:10

## 2023-10-21 RX ADMIN — HYDROMORPHONE HYDROCHLORIDE 1 MG: 2 INJECTION INTRAMUSCULAR; INTRAVENOUS; SUBCUTANEOUS at 10:10

## 2023-10-21 RX ADMIN — FERROUS SULFATE TAB 325 MG (65 MG ELEMENTAL FE) 1 EACH: 325 (65 FE) TAB at 08:10

## 2023-10-21 RX ADMIN — THERA TABS 1 TABLET: TAB at 11:10

## 2023-10-21 RX ADMIN — METRONIDAZOLE 500 MG: 500 INJECTION, SOLUTION INTRAVENOUS at 03:10

## 2023-10-21 RX ADMIN — CEFAZOLIN 2 G: 2 INJECTION, POWDER, FOR SOLUTION INTRAMUSCULAR; INTRAVENOUS at 10:10

## 2023-10-21 RX ADMIN — HYDROCODONE BITARTRATE AND ACETAMINOPHEN 1 TABLET: 5; 325 TABLET ORAL at 03:10

## 2023-10-21 NOTE — PROGRESS NOTES
"                                                                      Post operative hospital visit Note:                                                                               At [facility] on 10/21/2023 10:18 AM                                                                                                                                                                       Post operative day 2.    Linda Lopez 53 y.o. female was admitted following this procedure(s):  D&C/hysteroscopy with suctioning of endometrial contents performed by Dr. Thomas Dominguez.     Subjective (since yesterday):  Subjectively patient appears be improved according to the patient although she still is passing some blood clots from the vagina.    She is ambulating.  She has less significant pain /discomfort.  She actually states that the hypogastric discomfort is less than yesterday!  She is voiding - she thinks she sees some blood in her urination.  She has nausea or vomiting. She is tolerating her diet. She is hungry.  She states that the iron does upset her stomach.  She is passing flatus. She has bowel movement.    Lab:    Beta HCG: No results for input(s): "HCGPREGUR" in the last 48 hours.  CBC:   Recent Labs   Lab 10/21/23  0306   WBC 21.36*   RBC 3.22*   HGB 9.8*   HCT 27.7*      MCV 86.0   MCH 30.4   MCHC 35.4   WC is down from 25,000 five thousand.  She still has a 90% shift of neutrophils.  CMP:   Recent Labs   Lab 10/21/23  0306   BUN 11   CREATININE 1.11*     Histopathology report:  A. Endocervix, curettage:  Invasive squamous cell carcinoma and abundant blood (see comments)     B. Endometrium, curettage:  Invasive squamous cell carcinoma and abundant blood; intact endometrium is not identified (see comments)     C. Endometrium, curettings from suction:    - Invasive squamous cell carcinoma and abundant blood  - Intact endometrium is not identified  - See comments   Electronically signed by " "Reno, Ish OLEA MD on 10/20/2023 at 1432   Comments    The tumor has a variable appearance.  For the most part, it exhibits well to moderate squamous differentiation, but there are some areas that exhibit pseudoglandular differentiation.     No definite lymphovascular invasion seen.     Within specimen (C) there is a focus strongly suspicious for perineural invasion, but this is not confirmed on a properly controlled S100 immunostain.     Immunohistochemistry for p16 and p40 is also performed on block C4.  The tumor is strongly p16 and p40 positive.     Dr. Ayala agrees with the presence of an invasive squamous cell carcinoma.           Vital Signs: /78   Pulse 69   Temp 98.4 °F (36.9 °C) (Oral)   Resp 18   Ht 5' 10" (1.778 m)   Wt 72.6 kg (160 lb)   LMP  (LMP Unknown)   SpO2 98%   Breastfeeding No   BMI 22.96 kg/m²        Physical Exam:     The patient is alert and awake.    Skin color is  normal.     Chest:            LUNGS: clear to auscultation, no wheezes or rales, and unlabored breathing            Heart: regular rate and rhythm, S1, S2 normal, no murmur, click, rub or gallop      Abdomen:normal bowel sounds, without guarding, without rebound, and less discomfort over the uterine fibroids in the hypogastrium -improvement since yesterday.  The abdomen is not distended.     Vulvar exam: normal appearing vulva with no masses, tenderness or lesions, the perineal pad has no actual bleeding noted today on it.  There is a slight odor consistent with BV.  Odor is  more of a blood smell and necrotic tissue odor.     Legs: warm, normal color and negative Erica sign bilaterally; back exam is unremarkable there is no CVA tenderness bilaterally     Plan:     1) Ambulate   2) Advance diet; recommend multiple feeding diet with vitamins as well as iron supplementation that is ordered twice daily.   3) Deep breath and use incentive spirometry   4) Shower    5) Discharge from the hospital after Dr. Thomas BLANKENSHIP" Angelica returns and hopefully arrangements will be made for transfer to [facility] for ongoing oncologic care including surgery in radiation.   6) Additional recommendation(s): MRI of chest abdomen and pelvis for clinical staging of her cancer .    Salvador Denton MD  Uro-Gynecology  Signed:  10/21/2023 10:18 AM

## 2023-10-22 LAB
BUN SERPL-MCNC: 11 MG/DL (ref 7–18)
BUN/CREAT SERPL: 10 (ref 6–20)
CREAT SERPL-MCNC: 1.07 MG/DL (ref 0.55–1.02)
EGFR (NO RACE VARIABLE) (RUSH/TITUS): 63 ML/MIN/1.73M2

## 2023-10-22 PROCEDURE — 25000003 PHARM REV CODE 250: Performed by: OBSTETRICS & GYNECOLOGY

## 2023-10-22 PROCEDURE — 63600175 PHARM REV CODE 636 W HCPCS: Performed by: OBSTETRICS & GYNECOLOGY

## 2023-10-22 PROCEDURE — 99234 PR OBSERV/HOSP SAME DATE,LEVL III: ICD-10-PCS | Mod: GT,,, | Performed by: OBSTETRICS & GYNECOLOGY

## 2023-10-22 PROCEDURE — 11000001 HC ACUTE MED/SURG PRIVATE ROOM

## 2023-10-22 PROCEDURE — 99234 HOSP IP/OBS SM DT SF/LOW 45: CPT | Mod: GT,,, | Performed by: OBSTETRICS & GYNECOLOGY

## 2023-10-22 PROCEDURE — 84520 ASSAY OF UREA NITROGEN: CPT | Performed by: OBSTETRICS & GYNECOLOGY

## 2023-10-22 RX ADMIN — HYDROCODONE BITARTRATE AND ACETAMINOPHEN 1 TABLET: 5; 325 TABLET ORAL at 09:10

## 2023-10-22 RX ADMIN — ESCITALOPRAM OXALATE 10 MG: 10 TABLET ORAL at 08:10

## 2023-10-22 RX ADMIN — METRONIDAZOLE 500 MG: 500 INJECTION, SOLUTION INTRAVENOUS at 11:10

## 2023-10-22 RX ADMIN — METRONIDAZOLE 500 MG: 500 INJECTION, SOLUTION INTRAVENOUS at 07:10

## 2023-10-22 RX ADMIN — HYDROMORPHONE HYDROCHLORIDE 1 MG: 2 INJECTION INTRAMUSCULAR; INTRAVENOUS; SUBCUTANEOUS at 02:10

## 2023-10-22 RX ADMIN — METRONIDAZOLE 500 MG: 500 INJECTION, SOLUTION INTRAVENOUS at 03:10

## 2023-10-22 RX ADMIN — THERA TABS 1 TABLET: TAB at 09:10

## 2023-10-22 RX ADMIN — CEFAZOLIN 2 G: 2 INJECTION, POWDER, FOR SOLUTION INTRAMUSCULAR; INTRAVENOUS at 02:10

## 2023-10-22 RX ADMIN — THERA TABS 1 TABLET: TAB at 08:10

## 2023-10-22 RX ADMIN — HYDROMORPHONE HYDROCHLORIDE 1 MG: 2 INJECTION INTRAMUSCULAR; INTRAVENOUS; SUBCUTANEOUS at 10:10

## 2023-10-22 RX ADMIN — CEFAZOLIN 2 G: 2 INJECTION, POWDER, FOR SOLUTION INTRAMUSCULAR; INTRAVENOUS at 09:10

## 2023-10-22 RX ADMIN — HYDROMORPHONE HYDROCHLORIDE 1 MG: 2 INJECTION INTRAMUSCULAR; INTRAVENOUS; SUBCUTANEOUS at 03:10

## 2023-10-22 RX ADMIN — HYDROCODONE BITARTRATE AND ACETAMINOPHEN 1 TABLET: 5; 325 TABLET ORAL at 06:10

## 2023-10-22 RX ADMIN — FERROUS SULFATE TAB 325 MG (65 MG ELEMENTAL FE) 1 EACH: 325 (65 FE) TAB at 08:10

## 2023-10-22 RX ADMIN — CEFAZOLIN 2 G: 2 INJECTION, POWDER, FOR SOLUTION INTRAMUSCULAR; INTRAVENOUS at 07:10

## 2023-10-22 RX ADMIN — HYDROCODONE BITARTRATE AND ACETAMINOPHEN 1 TABLET: 5; 325 TABLET ORAL at 07:10

## 2023-10-22 RX ADMIN — HYDROMORPHONE HYDROCHLORIDE 1 MG: 2 INJECTION INTRAMUSCULAR; INTRAVENOUS; SUBCUTANEOUS at 07:10

## 2023-10-22 NOTE — PROGRESS NOTES
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 female was admitted following this procedure(s):  D&C/hysteroscopy performed by Dr. Thomas Dominguez  Subjective (since yesterday):  She states she feels better as regards to general anxiety and depression.  She is ambulating.  She did shower this morning.  She has stable but less pain /discomfort in the hypogastrium.  She has no voiding problems; vaginal bleeding trickle dark brown material.  This is not changed since her 1st postop day  She has no nausea or vomiting. She is tolerating her diet. She ishungry.  She is passing flatus. She has bowel movement again and she considers this bowel movement normal..    Lab:    CBC:   Recent Labs   Lab 10/21/23  0306   WBC 21.36*   RBC 3.22*   HGB 9.8*   HCT 27.7*      MCV 86.0   MCH 30.4   MCHC 35.4     No new labs ordered since yesterday.     Vital Signs: /89   Pulse 72   Temp 98.3 °F (36.8 °C) (Oral)   Resp 16   Ht 5' 10" (1.778 m)   Wt 72.6 kg (160 lb)   LMP  (LMP Unknown)   SpO2 99%   Breastfeeding No   BMI 22.96 kg/m²        Physical Exam:     The patient is alert and awake.  She actually is smiling today.    Skin color is  normal.     Chest:            LUNGS: clear to auscultation, no wheezes or rales, and unlabored breathing            Heart: regular rate and rhythm, S1, S2 normal, no murmur, " click, rub or gallop      Abdomen:soft, normal bowel sounds, without guarding, without rebound, and minimal tenderness over the hypogastrium where the enlarged uterus is located.  Basically improved since surgery but stable since yesterday in terms of discomfort.  There is no abdominal distention.     Vulvar exam: normal appearing vulva with no masses, tenderness or lesions, dark blackish brown watery discharge on the perineal pad.  No odor is noted today.     Legs: warm, normal color and negative Erica sign bilaterally; no CVA tenderness bilaterally     Plan:     1) Ambulate   2) diet as tolerate   3) Deep breath and use incentive spirometry   4) Continue to shower daily; continue on iron therapy and multiple vitamins; continue on Flagyl therapy; continue on Rocephin and Garamycin at least through tomorrow.   5) Discharge from the hospital after results for clinical staging of her presumed stage II cervical or uterine cancer.   6) Additional recommendation(s): Follow-up CBC in a.m.; follow-up CMP in a.m.; await results of tomorrow's MRI of the chest and abdomen for clinical radiologic staging of her neoplasm .   7) Dr. Denton recommends before discharge due social situation lack of transportation in poor family support arrangements for recommended transfer to [facility] for ongoing logic surgery adjunctive cure for her carcinoma.  Clinically the patient has a predominantly well-differentiated squamous cell carcinoma of both the cervix and the uterus -gynecologic standpoint patient probably has stage II carcinoma she will need clinical will need operative staging tertiary center.    8) Dr. Salvador Denton transfer care back to Dr. Thomas Dominguez tomorrow.  Salvador Denton MD  Uro-Gynecology  Signed:  10/22/2023 10:36 AM

## 2023-10-23 LAB
ALBUMIN SERPL BCP-MCNC: 2.1 G/DL (ref 3.5–5)
ALBUMIN/GLOB SERPL: 0.6 {RATIO}
ALP SERPL-CCNC: 178 U/L (ref 41–108)
ALT SERPL W P-5'-P-CCNC: 8 U/L (ref 13–56)
ANION GAP SERPL CALCULATED.3IONS-SCNC: 10 MMOL/L (ref 7–16)
AST SERPL W P-5'-P-CCNC: 23 U/L (ref 15–37)
BASOPHILS # BLD AUTO: 0.03 K/UL (ref 0–0.2)
BASOPHILS NFR BLD AUTO: 0.2 % (ref 0–1)
BILIRUB SERPL-MCNC: 0.5 MG/DL (ref ?–1.2)
BUN SERPL-MCNC: 10 MG/DL (ref 7–18)
BUN SERPL-MCNC: 10 MG/DL (ref 7–18)
BUN/CREAT SERPL: 11 (ref 6–20)
BUN/CREAT SERPL: 11 (ref 6–20)
CALCIUM SERPL-MCNC: 8.5 MG/DL (ref 8.5–10.1)
CHLORIDE SERPL-SCNC: 103 MMOL/L (ref 98–107)
CO2 SERPL-SCNC: 26 MMOL/L (ref 21–32)
CREAT SERPL-MCNC: 0.9 MG/DL (ref 0.55–1.02)
CREAT SERPL-MCNC: 0.9 MG/DL (ref 0.55–1.02)
DIFFERENTIAL METHOD BLD: ABNORMAL
EGFR (NO RACE VARIABLE) (RUSH/TITUS): 78 ML/MIN/1.73M2
EGFR (NO RACE VARIABLE) (RUSH/TITUS): 78 ML/MIN/1.73M2
EOSINOPHIL # BLD AUTO: 0.12 K/UL (ref 0–0.5)
EOSINOPHIL NFR BLD AUTO: 0.9 % (ref 1–4)
ERYTHROCYTE [DISTWIDTH] IN BLOOD BY AUTOMATED COUNT: 13.4 % (ref 11.5–14.5)
GLOBULIN SER-MCNC: 3.6 G/DL (ref 2–4)
GLUCOSE SERPL-MCNC: 94 MG/DL (ref 74–106)
HCT VFR BLD AUTO: 28.2 % (ref 38–47)
HGB BLD-MCNC: 9.6 G/DL (ref 12–16)
IMM GRANULOCYTES # BLD AUTO: 0.08 K/UL (ref 0–0.04)
IMM GRANULOCYTES NFR BLD: 0.6 % (ref 0–0.4)
LYMPHOCYTES # BLD AUTO: 0.99 K/UL (ref 1–4.8)
LYMPHOCYTES NFR BLD AUTO: 7.2 % (ref 27–41)
MCH RBC QN AUTO: 29.5 PG (ref 27–31)
MCHC RBC AUTO-ENTMCNC: 34 G/DL (ref 32–36)
MCV RBC AUTO: 86.8 FL (ref 80–96)
MONOCYTES # BLD AUTO: 1.58 K/UL (ref 0–0.8)
MONOCYTES NFR BLD AUTO: 11.4 % (ref 2–6)
MPC BLD CALC-MCNC: 11.6 FL (ref 9.4–12.4)
NEUTROPHILS # BLD AUTO: 11.03 K/UL (ref 1.8–7.7)
NEUTROPHILS NFR BLD AUTO: 79.7 % (ref 53–65)
NRBC # BLD AUTO: 0 X10E3/UL
NRBC, AUTO (.00): 0 %
PLATELET # BLD AUTO: 347 K/UL (ref 150–400)
POTASSIUM SERPL-SCNC: 3.8 MMOL/L (ref 3.5–5.1)
PROT SERPL-MCNC: 5.7 G/DL (ref 6.4–8.2)
RBC # BLD AUTO: 3.25 M/UL (ref 4.2–5.4)
SODIUM SERPL-SCNC: 135 MMOL/L (ref 136–145)
WBC # BLD AUTO: 13.83 K/UL (ref 4.5–11)

## 2023-10-23 PROCEDURE — 63600175 PHARM REV CODE 636 W HCPCS: Performed by: OBSTETRICS & GYNECOLOGY

## 2023-10-23 PROCEDURE — 25000003 PHARM REV CODE 250: Performed by: OBSTETRICS & GYNECOLOGY

## 2023-10-23 PROCEDURE — 99900035 HC TECH TIME PER 15 MIN (STAT)

## 2023-10-23 PROCEDURE — 85025 COMPLETE CBC W/AUTO DIFF WBC: CPT | Performed by: OBSTETRICS & GYNECOLOGY

## 2023-10-23 PROCEDURE — 84520 ASSAY OF UREA NITROGEN: CPT | Performed by: OBSTETRICS & GYNECOLOGY

## 2023-10-23 PROCEDURE — 80053 COMPREHEN METABOLIC PANEL: CPT | Performed by: OBSTETRICS & GYNECOLOGY

## 2023-10-23 PROCEDURE — 11000001 HC ACUTE MED/SURG PRIVATE ROOM

## 2023-10-23 PROCEDURE — 25500020 PHARM REV CODE 255: Performed by: OBSTETRICS & GYNECOLOGY

## 2023-10-23 PROCEDURE — A9577 INJ MULTIHANCE: HCPCS | Performed by: OBSTETRICS & GYNECOLOGY

## 2023-10-23 PROCEDURE — 82565 ASSAY OF CREATININE: CPT | Performed by: OBSTETRICS & GYNECOLOGY

## 2023-10-23 PROCEDURE — 94761 N-INVAS EAR/PLS OXIMETRY MLT: CPT

## 2023-10-23 RX ADMIN — CEFAZOLIN 2 G: 2 INJECTION, POWDER, FOR SOLUTION INTRAMUSCULAR; INTRAVENOUS at 05:10

## 2023-10-23 RX ADMIN — HYDROMORPHONE HYDROCHLORIDE 1 MG: 2 INJECTION INTRAMUSCULAR; INTRAVENOUS; SUBCUTANEOUS at 08:10

## 2023-10-23 RX ADMIN — GENTAMICIN SULFATE 240 MG: 40 INJECTION, SOLUTION INTRAMUSCULAR; INTRAVENOUS at 05:10

## 2023-10-23 RX ADMIN — CEFAZOLIN 2 G: 2 INJECTION, POWDER, FOR SOLUTION INTRAMUSCULAR; INTRAVENOUS at 02:10

## 2023-10-23 RX ADMIN — HYDROMORPHONE HYDROCHLORIDE 1 MG: 2 INJECTION INTRAMUSCULAR; INTRAVENOUS; SUBCUTANEOUS at 02:10

## 2023-10-23 RX ADMIN — HYDROCODONE BITARTRATE AND ACETAMINOPHEN 1 TABLET: 5; 325 TABLET ORAL at 12:10

## 2023-10-23 RX ADMIN — HYDROMORPHONE HYDROCHLORIDE 1 MG: 2 INJECTION INTRAMUSCULAR; INTRAVENOUS; SUBCUTANEOUS at 10:10

## 2023-10-23 RX ADMIN — HYDROCODONE BITARTRATE AND ACETAMINOPHEN 1 TABLET: 5; 325 TABLET ORAL at 06:10

## 2023-10-23 RX ADMIN — THERA TABS 1 TABLET: TAB at 08:10

## 2023-10-23 RX ADMIN — CEFAZOLIN 2 G: 2 INJECTION, POWDER, FOR SOLUTION INTRAMUSCULAR; INTRAVENOUS at 10:10

## 2023-10-23 RX ADMIN — THERA TABS 1 TABLET: TAB at 09:10

## 2023-10-23 RX ADMIN — METRONIDAZOLE 500 MG: 500 INJECTION, SOLUTION INTRAVENOUS at 10:10

## 2023-10-23 RX ADMIN — METRONIDAZOLE 500 MG: 500 INJECTION, SOLUTION INTRAVENOUS at 02:10

## 2023-10-23 RX ADMIN — HYDROMORPHONE HYDROCHLORIDE 1 MG: 2 INJECTION INTRAMUSCULAR; INTRAVENOUS; SUBCUTANEOUS at 06:10

## 2023-10-23 RX ADMIN — FERROUS SULFATE TAB 325 MG (65 MG ELEMENTAL FE) 1 EACH: 325 (65 FE) TAB at 08:10

## 2023-10-23 RX ADMIN — GADOBENATE DIMEGLUMINE 15 ML: 529 INJECTION, SOLUTION INTRAVENOUS at 02:10

## 2023-10-23 RX ADMIN — ESCITALOPRAM OXALATE 10 MG: 10 TABLET ORAL at 08:10

## 2023-10-23 RX ADMIN — METRONIDAZOLE 500 MG: 500 INJECTION, SOLUTION INTRAVENOUS at 06:10

## 2023-10-23 NOTE — PLAN OF CARE
Spoke with Louise in financial assistance. They are following patient. Dc plan is home with nephew.

## 2023-10-23 NOTE — PLAN OF CARE
Problem: Adult Inpatient Plan of Care  Goal: Plan of Care Review  10/23/2023 1559 by Hayley Olivas LPN  Outcome: Ongoing, Progressing  10/23/2023 1558 by Hayley Olivas LPN  Outcome: Ongoing, Progressing  Goal: Patient-Specific Goal (Individualized)  10/23/2023 1559 by Hayley Olivas LPN  Outcome: Ongoing, Progressing  10/23/2023 1558 by Hayley Olivas LPN  Outcome: Ongoing, Progressing  Goal: Absence of Hospital-Acquired Illness or Injury  10/23/2023 1559 by Hayley Olivas LPN  Outcome: Ongoing, Progressing  10/23/2023 1558 by Hayley Olivas LPN  Outcome: Ongoing, Progressing  Goal: Optimal Comfort and Wellbeing  10/23/2023 1559 by Hayley Olivas LPN  Outcome: Ongoing, Progressing  10/23/2023 1558 by Hayley Olivas LPN  Outcome: Ongoing, Progressing  Goal: Readiness for Transition of Care  10/23/2023 1559 by Hayley Olivas LPN  Outcome: Ongoing, Progressing  10/23/2023 1558 by Hayley Olivas LPN  Outcome: Ongoing, Progressing     Problem: Infection  Goal: Absence of Infection Signs and Symptoms  10/23/2023 1559 by Hayley Olivas LPN  Outcome: Ongoing, Progressing  10/23/2023 1558 by Hayley Olivas LPN  Outcome: Ongoing, Progressing     Problem: Pain Acute  Goal: Acceptable Pain Control and Functional Ability  Outcome: Ongoing, Progressing     Problem: Fatigue  Goal: Improved Activity Tolerance  Outcome: Ongoing, Progressing

## 2023-10-23 NOTE — PLAN OF CARE
Problem: Adult Inpatient Plan of Care  Goal: Plan of Care Review  Outcome: Ongoing, Progressing  Goal: Absence of Hospital-Acquired Illness or Injury  Outcome: Ongoing, Progressing  Intervention: Prevent and Manage VTE (Venous Thromboembolism) Risk  Flowsheets (Taken 10/23/2023 0627)  Activity Management: Ambulated to bathroom - L4  VTE Prevention/Management: ambulation promoted  Range of Motion: active ROM (range of motion) encouraged     Problem: Infection  Goal: Absence of Infection Signs and Symptoms  Outcome: Ongoing, Progressing  Intervention: Prevent or Manage Infection  Flowsheets (Taken 10/23/2023 0627)  Infection Management: aseptic technique maintained

## 2023-10-23 NOTE — PROGRESS NOTES
[facility]  Obstetrics & Gynecology  Progress Note    Patient Name: Linda Lopez  MRN: [id number]  Admission Date: 10/18/2023  Primary Care Provider: No, Primary Doctor  Principal Problem: <principal problem not specified>    Subjective:     Interval History:  Patient with clinical stage II squamous cell carcinoma of the cervix and uterus.  Symptomatically the patient continues to improve.  The patient is scheduled for an MRI today for staging.    Scheduled Meds:   ceFAZolin (ANCEF) IVPB  2 g Intravenous Q8H    EScitalopram oxalate  10 mg Oral Daily    ferrous sulfate  1 tablet Oral Daily    gentamicin  240 mg Intravenous Q36H    metronidazole  500 mg Intravenous Q8H    multivitamin  1 tablet Oral BID     Continuous Infusions:  PRN Meds:acetaminophen, diphenhydrAMINE, diphenhydrAMINE, Pharmacy to dose Aminoglycosides consult **AND** gentamicin - pharmacy to dose, HYDROcodone-acetaminophen, HYDROmorphone, ondansetron, prochlorperazine    Review of patient's allergies indicates:  No Known Allergies    Objective:     Vital Signs (Most Recent):  Temp: 98.2 °F (36.8 °C) (10/23/23 1412)  Pulse: 84 (10/23/23 1412)  Resp: 16 (10/23/23 1427)  BP: (!) 176/103 (10/23/23 1412)  SpO2: 100 % (10/23/23 1412) Vital Signs (24h Range):  Temp:  [98.2 °F (36.8 °C)-98.7 °F (37.1 °C)] 98.2 °F (36.8 °C)  Pulse:  [67-84] 84  Resp:  [16-18] 16  SpO2:  [98 %-100 %] 100 %  BP: (137-176)/() 176/103     Weight: 72.6 kg (160 lb)  Body mass index is 22.96 kg/m².  No LMP recorded (lmp unknown). Patient is perimenopausal.    I&O (Last 24H):  No intake or output data in the 24 hours ending 10/23/23 1506    Physical Exam:   Constitutional: She appears well-developed.       Cardiovascular:  Normal rate and regular rhythm.             Pulmonary/Chest: Effort normal and breath sounds normal.        Abdominal: Soft.                 Neurological: She is alert.    Skin: Skin is warm.    Psychiatric: She has a normal mood and  affect.       Laboratory:  CBC:   Recent Labs   Lab 10/23/23  0442   WBC 13.83*   RBC 3.25*   HGB 9.6*   HCT 28.2*      MCV 86.8   MCH 29.5   MCHC 34.0     CMP:   Recent Labs   Lab 10/23/23  0442   GLU 94   CALCIUM 8.5   ALBUMIN 2.1*   PROT 5.7*   *   K 3.8   CO2 26      BUN 10  10   CREATININE 0.90  0.90   ALKPHOS 178*   ALT 8*   AST 23   BILITOT 0.5     I have personallly reviewed all pertinent lab results from the last 24 hours.    Diagnostic Results:  Labs: Reviewed    Assessment/Plan:     There are no hospital problems to display for this patient.      The patient's white blood count is down to 13,830. Creatinine is 0.9.  Waiting results of her MRI scan with plans for possible transfer of the patient to [facility] tomorrow if the arrangements can be made.    Thomas Dominguez MD  Obstetrics & Gynecology  [facility]

## 2023-10-24 VITALS
TEMPERATURE: 98 F | OXYGEN SATURATION: 99 % | SYSTOLIC BLOOD PRESSURE: 149 MMHG | HEART RATE: 70 BPM | RESPIRATION RATE: 20 BRPM | DIASTOLIC BLOOD PRESSURE: 95 MMHG | HEIGHT: 70 IN | WEIGHT: 160 LBS | BODY MASS INDEX: 22.9 KG/M2

## 2023-10-24 LAB
BUN SERPL-MCNC: 11 MG/DL (ref 7–18)
BUN/CREAT SERPL: 11 (ref 6–20)
CREAT SERPL-MCNC: 0.96 MG/DL (ref 0.55–1.02)
EGFR (NO RACE VARIABLE) (RUSH/TITUS): 72 ML/MIN/1.73M2

## 2023-10-24 PROCEDURE — 63600175 PHARM REV CODE 636 W HCPCS: Performed by: OBSTETRICS & GYNECOLOGY

## 2023-10-24 PROCEDURE — 94761 N-INVAS EAR/PLS OXIMETRY MLT: CPT

## 2023-10-24 PROCEDURE — 99900035 HC TECH TIME PER 15 MIN (STAT)

## 2023-10-24 PROCEDURE — 25000003 PHARM REV CODE 250: Performed by: OBSTETRICS & GYNECOLOGY

## 2023-10-24 PROCEDURE — 58558 HYSTEROSCOPY BIOPSY: CPT | Mod: ,,, | Performed by: OBSTETRICS & GYNECOLOGY

## 2023-10-24 PROCEDURE — 84520 ASSAY OF UREA NITROGEN: CPT | Performed by: OBSTETRICS & GYNECOLOGY

## 2023-10-24 PROCEDURE — 11000001 HC ACUTE MED/SURG PRIVATE ROOM

## 2023-10-24 PROCEDURE — 82565 ASSAY OF CREATININE: CPT | Performed by: OBSTETRICS & GYNECOLOGY

## 2023-10-24 PROCEDURE — 58558 PR HYSTEROSCOPY,W/ENDO BX: ICD-10-PCS | Mod: ,,, | Performed by: OBSTETRICS & GYNECOLOGY

## 2023-10-24 RX ADMIN — HYDROMORPHONE HYDROCHLORIDE 1 MG: 2 INJECTION INTRAMUSCULAR; INTRAVENOUS; SUBCUTANEOUS at 09:10

## 2023-10-24 RX ADMIN — ESCITALOPRAM OXALATE 10 MG: 10 TABLET ORAL at 09:10

## 2023-10-24 RX ADMIN — HYDROMORPHONE HYDROCHLORIDE 1 MG: 2 INJECTION INTRAMUSCULAR; INTRAVENOUS; SUBCUTANEOUS at 01:10

## 2023-10-24 RX ADMIN — CEFAZOLIN 2 G: 2 INJECTION, POWDER, FOR SOLUTION INTRAMUSCULAR; INTRAVENOUS at 01:10

## 2023-10-24 RX ADMIN — HYDROMORPHONE HYDROCHLORIDE 1 MG: 2 INJECTION INTRAMUSCULAR; INTRAVENOUS; SUBCUTANEOUS at 05:10

## 2023-10-24 RX ADMIN — CEFAZOLIN 2 G: 2 INJECTION, POWDER, FOR SOLUTION INTRAMUSCULAR; INTRAVENOUS at 05:10

## 2023-10-24 RX ADMIN — HYDROCODONE BITARTRATE AND ACETAMINOPHEN 1 TABLET: 5; 325 TABLET ORAL at 02:10

## 2023-10-24 RX ADMIN — METRONIDAZOLE 500 MG: 500 INJECTION, SOLUTION INTRAVENOUS at 02:10

## 2023-10-24 RX ADMIN — FERROUS SULFATE TAB 325 MG (65 MG ELEMENTAL FE) 1 EACH: 325 (65 FE) TAB at 09:10

## 2023-10-24 RX ADMIN — METRONIDAZOLE 500 MG: 500 INJECTION, SOLUTION INTRAVENOUS at 10:10

## 2023-10-24 NOTE — PROGRESS NOTES
[facility]  Obstetrics & Gynecology  Progress Note    Patient Name: Linda Lopez  MRN: [id number]  Admission Date: 10/18/2023  Primary Care Provider: No, Primary Doctor  Principal Problem: <principal problem not specified>    Subjective:     Interval History:  The patient continues to do well.  Still having pain mainly in the right lower quadrant.    Scheduled Meds:   ceFAZolin (ANCEF) IVPB  2 g Intravenous Q8H    EScitalopram oxalate  10 mg Oral Daily    ferrous sulfate  1 tablet Oral Daily    gentamicin  240 mg Intravenous Q36H    metronidazole  500 mg Intravenous Q8H     Continuous Infusions:  PRN Meds:acetaminophen, diphenhydrAMINE, diphenhydrAMINE, Pharmacy to dose Aminoglycosides consult **AND** gentamicin - pharmacy to dose, HYDROcodone-acetaminophen, HYDROmorphone, ondansetron, prochlorperazine    Review of patient's allergies indicates:  No Known Allergies    Objective:     Vital Signs (Most Recent):  Temp: 98.1 °F (36.7 °C) (10/24/23 1045)  Pulse: 69 (10/24/23 1045)  Resp: 20 (10/24/23 1045)  BP: 126/78 (10/24/23 1045)  SpO2: 98 % (10/24/23 1045) Vital Signs (24h Range):  Temp:  [97.9 °F (36.6 °C)-99.3 °F (37.4 °C)] 98.1 °F (36.7 °C)  Pulse:  [67-84] 69  Resp:  [16-20] 20  SpO2:  [97 %-100 %] 98 %  BP: (126-176)/() 126/78     Weight: 72.6 kg (160 lb)  Body mass index is 22.96 kg/m².  No LMP recorded (lmp unknown). Patient is perimenopausal.    I&O (Last 24H):  No intake or output data in the 24 hours ending 10/24/23 1150    Physical Exam:   Constitutional: She appears well-developed.       Cardiovascular:  Normal rate and regular rhythm.             Pulmonary/Chest: Effort normal and breath sounds normal.        Abdominal: Soft.                 Neurological: She is alert.    Skin: Skin is warm.    Psychiatric: She has a normal mood and affect.       Laboratory:  CBC:   Recent Labs   Lab 10/23/23  0442   WBC 13.83*   RBC 3.25*   HGB 9.6*   HCT 28.2*      MCV 86.8   MCH 29.5    MCHC 34.0     CMP:   Recent Labs   Lab 10/23/23  0442 10/24/23  0322   GLU 94  --    CALCIUM 8.5  --    ALBUMIN 2.1*  --    PROT 5.7*  --    *  --    K 3.8  --    CO2 26  --      --    BUN 10  10 11   CREATININE 0.90  0.90 0.96   ALKPHOS 178*  --    ALT 8*  --    AST 23  --    BILITOT 0.5  --      I have personallly reviewed all pertinent lab results from the last 24 hours.    Diagnostic Results:  MRI: Reviewed    Assessment/Plan:     There are no hospital problems to display for this patient.      The patient has documented squamous carcinoma involving the cervix and the uterus.  MRI done yesterday shows partial right ureteral obstruction with right-sided hydro nephrosis.  She is been transferred to gyn Oncology at [facility] for further evaluation and treatment.    Thomas Dominguez MD  Obstetrics & Gynecology  [facility]

## 2023-10-24 NOTE — PLAN OF CARE
Notified by nurse that Dr wants transfer to [facility]. Packet provided and placed in cubby. Relayed info to nurse that Dr will need to place order with [facility].

## 2023-10-24 NOTE — PLAN OF CARE
Problem: Adult Inpatient Plan of Care  Goal: Plan of Care Review  Outcome: Met  Goal: Patient-Specific Goal (Individualized)  Outcome: Met  Goal: Absence of Hospital-Acquired Illness or Injury  Outcome: Met  Goal: Optimal Comfort and Wellbeing  Outcome: Met  Goal: Readiness for Transition of Care  Outcome: Met     Problem: Infection  Goal: Absence of Infection Signs and Symptoms  Outcome: Met     Problem: Pain Acute  Goal: Acceptable Pain Control and Functional Ability  Outcome: Met     Problem: Fatigue  Goal: Improved Activity Tolerance  Outcome: Met

## 2023-10-24 NOTE — NURSING
Patient picked up via [facility] to be transported to room at [facility] per Dr. Pineda. Patient IV still intact for transportation, paper work given to [facility] at this time

## 2023-10-25 PROBLEM — C53.9 CERVICAL CANCER: Status: ACTIVE | Noted: 2023-10-23

## 2023-10-25 NOTE — DISCHARGE SUMMARY
[facility]  Obstetrics & Gynecology  Discharge Summary    Patient Name: Linda Lopez  MRN: [id number]  Admission Date: 10/18/2023  Hospital Length of Stay: 5 days  Discharge Date and Time: 10/24/23  Attending Physician: Thomas Purdy MD   Discharging Provider: Thomas Purdy MD  Primary Care Provider: Yandy Primary Doctor    HPI:  The patient is a 53-year-old female with a history of heavy irregular bleeding and significant pelvic pain.  The patient also had an elevated white blood count.  The patient was treated as an outpatient with IM and oral antibiotics.  Ultrasound evaluation confirm multiple large uterine leiomyomata and thickened endometrium.  The patient was scheduled for a D&C hysteroscopy.    Hospital Course:  The patient was admitted and had a D&C.  Operative findings were somewhat hardened mildly irregular cervix.  The D and C specimen reveal necrotic tissue consistent with carcinoma.  Pathology report from the surgical specimen confirmed squamous cell carcinoma of the cervix and the uterine cavity.  The patient was placed on triple antibiotic therapy because of her pain and elevated white blood count.  Gradually the pain improved and her white blood count improved.  MRI of the abdomen and pelvis showed no distal metastasis.  It did show enlargement around the cervical area with partial right ureteral obstruction and right hydronephrosis.    Procedure(s) (LRB):  HYSTEROSCOPY, WITH DILATION AND CURETTAGE OF UTERUS (N/A)  DILATION AND CURETTAGE, UTERUS, USING SUCTION     Consults:   Consults (From admission, onward)          Status Ordering Provider     Inpatient consult to Social Work  Once        Provider:  (Not yet assigned)    Completed CEFERINO ELLIOTT     Pharmacy to dose Aminoglycosides consult  Once        Provider:  (Not yet assigned)   See hospitalspace for full Linked Orders Report.    Acknowledged THOMAS PURDY            Significant Diagnostic Studies: Labs: [facility]   Recent Labs   Lab  "10/24/23  0322   BUN 11   CREATININE 0.96   , CBC No results for input(s): "WBC", "HGB", "HCT", "PLT" in the last 48 hours., and All labs within the past 24 hours have been reviewed  Radiology: MRI:  Specimen (24h ago, onward)      None            Pending Diagnostic Studies:       Procedure Component Value Units Date/Time    EXTRA TUBES [[id number]] Collected: 10/19/23 0520    Order Status: Sent Lab Status: In process Updated: 10/19/23 0520    Specimen: Blood, Venous     Narrative:      The following orders were created for panel order EXTRA TUBES.  Procedure                               Abnormality         Status                     ---------                               -----------         ------                     Gold Top Hold[[id number]]                                   In process                   Please view results for these tests on the individual orders.          Final Active Diagnoses:    Diagnosis Date Noted POA    PRINCIPAL PROBLEM:  Cervical cancer [C53.9] 10/23/2023 Yes      Problems Resolved During this Admission:        Discharged Condition: fair    Disposition: Another Health Care Inst*    Follow Up:    Patient Instructions:   No discharge procedures on file.  Medications:  None    Thomas Dominguez MD  Obstetrics & Gynecology  [facility]  [facility]  Discharge Note  Short Stay    Procedure(s) (LRB):  HYSTEROSCOPY, WITH DILATION AND CURETTAGE OF UTERUS (N/A)  DILATION AND CURETTAGE, UTERUS, USING SUCTION      OUTCOME:  The patient was transferred to [facility] gyn Oncology    DISPOSITION: Discharged to Other Facility    FINAL DIAGNOSIS:  Cervical cancer    FOLLOWUP: None    DISCHARGE INSTRUCTIONS:  No discharge procedures on file.     TIME SPENT ON DISCHARGE:  60 minutes  "

## 2023-10-25 NOTE — PLAN OF CARE
[facility]  Discharge Final Note    Primary Care Provider: No, Primary Doctor    Expected Discharge Date: 10/25/2023    Final Discharge Note (most recent)       Final Note - 10/25/23 1017          Final Note    Assessment Type Final Discharge Note     Anticipated Discharge Disposition Short Term Hospital                     Important Message from Medicare             Patient discharged to [facility] for higher level of care.

## (undated) DEVICE — KIT LITHOTOMY RUSH

## (undated) DEVICE — SET DISPOSABLE COLLECTION

## (undated) DEVICE — GLOVE 7.0 PROTEXIS PI BLUE

## (undated) DEVICE — GLOVE PROTEXIS PI SYN SURG 6.5

## (undated) DEVICE — TRAY VAG PREP

## (undated) DEVICE — CURRETTE VACCUUM CURVED 9MM

## (undated) DEVICE — GLOVE PROTEXIS PI SYN SURG 6.0

## (undated) DEVICE — SET CYSTO IRR DRP CHMBR 84IN

## (undated) DEVICE — SOL NACL IRR 3000ML

## (undated) DEVICE — GOWN ASTOUND AAMI LVL3 BLUE LG

## (undated) DEVICE — GLOVE PROTEXIS PI SYN SURG 7.5

## (undated) DEVICE — GLOVE 6.5 PROTEXIS PI BLUE